# Patient Record
Sex: FEMALE | Race: BLACK OR AFRICAN AMERICAN | NOT HISPANIC OR LATINO | Employment: OTHER | URBAN - METROPOLITAN AREA
[De-identification: names, ages, dates, MRNs, and addresses within clinical notes are randomized per-mention and may not be internally consistent; named-entity substitution may affect disease eponyms.]

---

## 2017-01-29 ENCOUNTER — HOSPITAL ENCOUNTER (EMERGENCY)
Facility: CLINIC | Age: 67
Discharge: HOME | End: 2017-01-29
Attending: INTERNAL MEDICINE | Admitting: INTERNAL MEDICINE
Payer: COMMERCIAL

## 2017-01-29 ENCOUNTER — APPOINTMENT (OUTPATIENT)
Dept: GENERAL RADIOLOGY | Facility: CLINIC | Age: 67
End: 2017-01-29
Attending: INTERNAL MEDICINE
Payer: COMMERCIAL

## 2017-01-29 VITALS
TEMPERATURE: 98.2 F | RESPIRATION RATE: 20 BRPM | DIASTOLIC BLOOD PRESSURE: 80 MMHG | HEART RATE: 104 BPM | SYSTOLIC BLOOD PRESSURE: 148 MMHG | OXYGEN SATURATION: 93 %

## 2017-01-29 DIAGNOSIS — R42 DIZZINESS: ICD-10-CM

## 2017-01-29 LAB
ALBUMIN SERPL-MCNC: 3.3 G/DL (ref 3.4–5)
ALP SERPL-CCNC: 62 U/L (ref 40–150)
ALT SERPL W P-5'-P-CCNC: 20 U/L (ref 0–50)
ANION GAP SERPL CALCULATED.3IONS-SCNC: 7 MMOL/L (ref 3–14)
AST SERPL W P-5'-P-CCNC: 16 U/L (ref 0–45)
BASOPHILS # BLD AUTO: 0.1 10E9/L (ref 0–0.2)
BASOPHILS NFR BLD AUTO: 0.6 %
BILIRUB SERPL-MCNC: 0.2 MG/DL (ref 0.2–1.3)
BUN SERPL-MCNC: 10 MG/DL (ref 7–30)
CALCIUM SERPL-MCNC: 9.4 MG/DL (ref 8.5–10.1)
CHLORIDE SERPL-SCNC: 104 MMOL/L (ref 94–109)
CO2 SERPL-SCNC: 29 MMOL/L (ref 20–32)
CREAT SERPL-MCNC: 0.81 MG/DL (ref 0.52–1.04)
DIFFERENTIAL METHOD BLD: ABNORMAL
EOSINOPHIL # BLD AUTO: 0.3 10E9/L (ref 0–0.7)
EOSINOPHIL NFR BLD AUTO: 3.1 %
ERYTHROCYTE [DISTWIDTH] IN BLOOD BY AUTOMATED COUNT: 14.6 % (ref 10–15)
GFR SERPL CREATININE-BSD FRML MDRD: 71 ML/MIN/1.7M2
GLUCOSE BLDC GLUCOMTR-MCNC: 95 MG/DL (ref 70–99)
GLUCOSE SERPL-MCNC: 82 MG/DL (ref 70–99)
HCT VFR BLD AUTO: 42 % (ref 35–47)
HGB BLD-MCNC: 13.3 G/DL (ref 11.7–15.7)
IMM GRANULOCYTES # BLD: 0 10E9/L (ref 0–0.4)
IMM GRANULOCYTES NFR BLD: 0.2 %
LYMPHOCYTES # BLD AUTO: 2.6 10E9/L (ref 0.8–5.3)
LYMPHOCYTES NFR BLD AUTO: 27.1 %
MCH RBC QN AUTO: 26.2 PG (ref 26.5–33)
MCHC RBC AUTO-ENTMCNC: 31.7 G/DL (ref 31.5–36.5)
MCV RBC AUTO: 83 FL (ref 78–100)
MONOCYTES # BLD AUTO: 0.8 10E9/L (ref 0–1.3)
MONOCYTES NFR BLD AUTO: 8.3 %
NEUTROPHILS # BLD AUTO: 5.8 10E9/L (ref 1.6–8.3)
NEUTROPHILS NFR BLD AUTO: 60.7 %
NRBC # BLD AUTO: 0 10*3/UL
NRBC BLD AUTO-RTO: 0 /100
PLATELET # BLD AUTO: 333 10E9/L (ref 150–450)
POTASSIUM SERPL-SCNC: 3.9 MMOL/L (ref 3.4–5.3)
PROT SERPL-MCNC: 8.1 G/DL (ref 6.8–8.8)
RBC # BLD AUTO: 5.07 10E12/L (ref 3.8–5.2)
SODIUM SERPL-SCNC: 140 MMOL/L (ref 133–144)
TROPONIN I SERPL-MCNC: NORMAL UG/L (ref 0–0.04)
WBC # BLD AUTO: 9.6 10E9/L (ref 4–11)

## 2017-01-29 PROCEDURE — 00000146 ZZHCL STATISTIC GLUCOSE BY METER IP

## 2017-01-29 PROCEDURE — 84484 ASSAY OF TROPONIN QUANT: CPT | Performed by: INTERNAL MEDICINE

## 2017-01-29 PROCEDURE — 93005 ELECTROCARDIOGRAM TRACING: CPT | Performed by: INTERNAL MEDICINE

## 2017-01-29 PROCEDURE — 99285 EMERGENCY DEPT VISIT HI MDM: CPT | Mod: 25 | Performed by: INTERNAL MEDICINE

## 2017-01-29 PROCEDURE — 25900017 H RX MED GY IP 259 OP 259 PS 637: Performed by: INTERNAL MEDICINE

## 2017-01-29 PROCEDURE — 85025 COMPLETE CBC W/AUTO DIFF WBC: CPT | Performed by: INTERNAL MEDICINE

## 2017-01-29 PROCEDURE — 71010 XR CHEST PORT 1 VW: CPT

## 2017-01-29 PROCEDURE — 80053 COMPREHEN METABOLIC PANEL: CPT | Performed by: INTERNAL MEDICINE

## 2017-01-29 PROCEDURE — 93010 ELECTROCARDIOGRAM REPORT: CPT | Mod: Z6 | Performed by: INTERNAL MEDICINE

## 2017-01-29 PROCEDURE — 25000128 H RX IP 250 OP 636: Performed by: INTERNAL MEDICINE

## 2017-01-29 PROCEDURE — 96360 HYDRATION IV INFUSION INIT: CPT | Performed by: INTERNAL MEDICINE

## 2017-01-29 RX ORDER — MECLIZINE HYDROCHLORIDE 25 MG/1
25 TABLET ORAL ONCE
Status: COMPLETED | OUTPATIENT
Start: 2017-01-29 | End: 2017-01-29

## 2017-01-29 RX ORDER — ONDANSETRON 4 MG/1
4 TABLET, ORALLY DISINTEGRATING ORAL EVERY 8 HOURS PRN
Qty: 10 TABLET | Refills: 0 | Status: SHIPPED | OUTPATIENT
Start: 2017-01-29 | End: 2017-02-01

## 2017-01-29 RX ORDER — MECLIZINE HCL 12.5 MG 12.5 MG/1
12.5 TABLET ORAL 4 TIMES DAILY PRN
Qty: 30 TABLET | Refills: 0 | Status: SHIPPED | OUTPATIENT
Start: 2017-01-29 | End: 2017-04-28

## 2017-01-29 RX ADMIN — MECLIZINE HYDROCHLORIDE 25 MG: 25 TABLET ORAL at 16:06

## 2017-01-29 RX ADMIN — SODIUM CHLORIDE 1000 ML: 9 INJECTION, SOLUTION INTRAVENOUS at 16:01

## 2017-01-29 ASSESSMENT — ENCOUNTER SYMPTOMS
NAUSEA: 1
HEADACHES: 1
DIZZINESS: 1
FEVER: 0
SHORTNESS OF BREATH: 0
ABDOMINAL PAIN: 0
APPETITE CHANGE: 1
LIGHT-HEADEDNESS: 0
VOMITING: 0

## 2017-01-29 NOTE — ED AVS SNAPSHOT
[facility], [place], Emergency Department    [address]    [facility] [place] [address]    Phone:  [phone]    Fax:  [phone]                                       Earnest Short   MRN: [id number]    Department:  [facility], Emergency Department   Date of Visit:  1/29/2017           After Visit Summary Signature Page     I have received my discharge instructions, and my questions have been answered. I have discussed any challenges I see with this plan with the nurse or doctor.    ..........................................................................................................................................  Patient/Patient Representative Signature      ..........................................................................................................................................  Patient Representative Print Name and Relationship to Patient    ..................................................               ................................................  Date                                            Time    ..........................................................................................................................................  Reviewed by Signature/Title    ...................................................              ..............................................  Date                                                            Time

## 2017-01-29 NOTE — DISCHARGE INSTRUCTIONS
Dizziness (Vertigo) and Balance Problems: Ensuring Your Safety  Falls or accidents can lead to pain, broken bones, and fear of future falls. Protect yourself and others by preparing for episodes. Simple steps can help increase your safety at home and wherever you go.  Lighting    Keep all areas well lit. This helps your eyes send the right signals to the brain. It also makes you less likely to trip and fall. If bright lights make symptoms worse, dim the lights or lie in a dark room until the dizziness passes. Then turn the lights back to their normal level.  Tips:    Keep a flashlight by the bed.    Place nightlights in bathrooms and hallways.    Replace burned-out bulbs, or have someone replace them for you.  Fall prevention  To reduce your risk of falling:    Rise out of a bed or chair slowly.    Wear low-heeled shoes that fit properly and have slip-resistant soles.    Remove throw rugs. Clear clutter from walkways.    Use handrails on stairs. Have handrails installed or adjusted if needed.    Install grab bars in the bathroom. Don't use towel racks for balance.    Use a shower stool. Also, apply adhesive strips to the shower or tub floor.  Going out  With a little time and preparation, you can get around safely.  Tips:    Bring a cane or walking aid if needed.    Give yourself plenty of time in case a dizziness episode begins.    Be patient. If an activity like walking through a crowded shop causes you stress, you may not be ready for it yet.  Driving  If you become dizzy or disoriented while driving, you could hurt yourself and others. That's why it's best to avoid driving until symptoms subside. In some cases, your license may be temporarily held until it's safe for you to drive again.  For safety:    Ask a friend to drive for you.    Take public transportation.    Walk to stores and other places when you can.  Asking for help  Don't be afraid to ask for help running errands, cooking meals, and doing  exercise. Whether it's a friend, loved one, neighbor, or stranger on the street, a little help can make a world of difference.     6913-7303 [employer]. [address] [place] [address]99. All rights reserved. This information is not intended as a substitute for professional medical care. Always follow your healthcare professional's instructions.          Managing Dizziness (Vertigo) with Medications  Although medications can't cure your problem, they can help control symptoms. Your doctor may prescribe medications for a few weeks and then taper them off.  If you have side effects from your medications, contact your healthcare provider right away.   How medications can help      Treat infection or inflammation. If you have a bacterial infection, your doctor can prescribe antibiotics.    Limit conflicting balance signals. These medications are often in pill form.    Ease nausea. Suppositories, pills, or shots may be used to reduce vomiting.    Reduce pressure in the canals. Diuretics can be used to treat Meniere's disease. These medications help rid the body of excess fluid.    Ease other symptoms. Other medications can help ease depression and anxiety caused by living with dizziness or fainting.    [phone] [employer]. [address] [place] [address] 14240. All rights reserved. This information is not intended as a substitute for professional medical care. Always follow your healthcare professional's instructions.      Please make an appointment to follow up with Your Primary Care Provider in 3-7 days even if entirely better.

## 2017-01-29 NOTE — ED AVS SNAPSHOT
[facility], Emergency Department    [address]    [facility] [place] [address]    Phone:  [phone]    Fax:  [phone]                                       Earnest Short   MRN: [id number]    Department:  [facility], Emergency Department   Date of Visit:  1/29/2017           Patient Information     Date Of Birth          1950        Your diagnoses for this visit were:     Dizziness        You were seen by Ruben Cavrer MD.        Discharge Instructions         Dizziness (Vertigo) and Balance Problems: Ensuring Your Safety  Falls or accidents can lead to pain, broken bones, and fear of future falls. Protect yourself and others by preparing for episodes. Simple steps can help increase your safety at home and wherever you go.  Lighting    Keep all areas well lit. This helps your eyes send the right signals to the brain. It also makes you less likely to trip and fall. If bright lights make symptoms worse, dim the lights or lie in a dark room until the dizziness passes. Then turn the lights back to their normal level.  Tips:    Keep a flashlight by the bed.    Place nightlights in bathrooms and hallways.    Replace burned-out bulbs, or have someone replace them for you.  Fall prevention  To reduce your risk of falling:    Rise out of a bed or chair slowly.    Wear low-heeled shoes that fit properly and have slip-resistant soles.    Remove throw rugs. Clear clutter from walkways.    Use handrails on stairs. Have handrails installed or adjusted if needed.    Install grab bars in the bathroom. Don't use towel racks for balance.    Use a shower stool. Also, apply adhesive strips to the shower or tub floor.  Going out  With a little time and preparation, you can get around safely.  Tips:    Bring a cane or walking aid if needed.    Give yourself plenty of time in case a dizziness episode begins.    Be patient. If an activity like walking through a crowded shop causes you stress, you may not be ready  for it yet.  Driving  If you become dizzy or disoriented while driving, you could hurt yourself and others. That's why it's best to avoid driving until symptoms subside. In some cases, your license may be temporarily held until it's safe for you to drive again.  For safety:    Ask a friend to drive for you.    Take public transportation.    Walk to stores and other places when you can.  Asking for help  Don't be afraid to ask for help running errands, cooking meals, and doing exercise. Whether it's a friend, loved one, neighbor, or stranger on the street, a little help can make a world of difference.     [phone] [employer]. [address] [place] [address] 40369. All rights reserved. This information is not intended as a substitute for professional medical care. Always follow your healthcare professional's instructions.          Managing Dizziness (Vertigo) with Medications  Although medications can't cure your problem, they can help control symptoms. Your doctor may prescribe medications for a few weeks and then taper them off.  If you have side effects from your medications, contact your healthcare provider right away.   How medications can help      Treat infection or inflammation. If you have a bacterial infection, your doctor can prescribe antibiotics.    Limit conflicting balance signals. These medications are often in pill form.    Ease nausea. Suppositories, pills, or shots may be used to reduce vomiting.    Reduce pressure in the canals. Diuretics can be used to treat Meniere's disease. These medications help rid the body of excess fluid.    Ease other symptoms. Other medications can help ease depression and anxiety caused by living with dizziness or fainting.    [phone] [employer]. [address] [place] [address]29. All rights reserved. This information is not intended as a substitute for professional medical care. Always follow your healthcare professional's  instructions.      Please make an appointment to follow up with Your Primary Care Provider in 3-7 days even if entirely better.     24 Hour Appointment Hotline       To make an appointment at any [facility], call [phone] ([phone]). If you don't have a family doctor or clinic, we will help you find one. [facility] clinics are conveniently located to serve the needs of you and your family.             Review of your medicines      START taking        Dose / Directions Last dose taken    meclizine 12.5 MG tablet   Commonly known as:  ANTIVERT   Dose:  12.5 mg   Quantity:  30 tablet        Take 1 tablet (12.5 mg) by mouth 4 times daily as needed for dizziness   Refills:  0        ondansetron 4 MG ODT tab   Commonly known as:  ZOFRAN ODT   Dose:  4 mg   Quantity:  10 tablet        Take 1 tablet (4 mg) by mouth every 8 hours as needed for nausea   Refills:  0          Our records show that you are taking the medicines listed below. If these are incorrect, please call your family doctor or clinic.        Dose / Directions Last dose taken    Calcium Carb-Cholecalciferol 1000-800 MG-UNIT Tabs   Commonly known as:  CALCIUM 1000 + D   Dose:  1 tablet   Quantity:  100 tablet        Take 1 tablet by mouth daily TAKE WITH FOOD, FOR BONE HEALTH AND LOW VITAMIN D ([facility])   Refills:  PRN        cholecalciferol 2000 UNITS Caps   Dose:  2 capsule   Quantity:  100 capsule        Take 2 capsules by mouth daily FOR VITAMIN D DEFICIENCY (LOW VITAMIN D)   Refills:  PRN        pantoprazole 40 MG EC tablet   Commonly known as:  PROTONIX   Dose:  40 mg   Quantity:  90 tablet        Take 1 tablet (40 mg) by mouth daily Take 30-60 minutes before a meal.   Refills:  0        * polyethylene glycol powder   Commonly known as:  MIRALAX   Dose:  1 capful   Quantity:  510 g        Take 17 g by mouth daily as needed for constipation   Refills:  1        * polyethylene glycol powder   Commonly known as:   MIRALAX   Dose:  1 capful   Quantity:  1 Bottle        Take 17 g by mouth daily INDICATION: CONSTIPATION, TO ACHIEVE 1-2 SOFT BMs PER DAY   Refills:  PRN        traMADol 50 MG tablet   Commonly known as:  ULTRAM   Dose:  50 mg   Quantity:  60 tablet        Take 1 tablet (50 mg) by mouth every 6 hours as needed for moderate pain   Refills:  0        * Notice:  This list has 2 medication(s) that are the same as other medications prescribed for you. Read the directions carefully, and ask your doctor or other care provider to review them with you.            Prescriptions were sent or printed at these locations (2 Prescriptions)                   Other Prescriptions                Printed at Department/Unit printer (2 of 2)         meclizine (ANTIVERT) 12.5 MG tablet               ondansetron (ZOFRAN ODT) 4 MG ODT tab                Procedures and tests performed during your visit     CBC with platelets differential    Comprehensive metabolic panel    EKG 12 lead    Glucose by meter    Glucose monitor nursing POCT    Troponin I    XR Chest Port 1 View      Orders Needing Specimen Collection     Ordered          01/29/17 1525  UA reflex to Microscopic and Culture - STAT, [facility]: STAT, Needs to be Collected     Scheduled Task Status   01/29/17 1525 Collect UA reflex to Microscopic and Culture Open   Order Class:  PCU Collect                  Pending Results     No orders found from 1/28/2017 to 1/30/2017.            Pending Culture Results     No orders found from 1/28/2017 to 1/30/2017.            Thank you for choosing [facility]       Thank you for choosing [facility] for your care. Our goal is always to provide you with excellent care. Hearing back from our patients is one way we can continue to improve our services. Please take a few minutes to complete the written survey that you may receive in the mail after you visit with us. Thank you!        [email or web address] Information     [employer] lets you send messages to your doctor, view  "your test results, renew your prescriptions, schedule appointments and more. To sign up, go to [email or web address] . Click on \"Log in\" on the left side of the screen, which will take you to the Welcome page. Then click on \"Sign up Now\" on the right side of the page.     You will be asked to enter the access code listed below, as well as some personal information. Please follow the directions to create your username and password.     Your access code is: [email or web address]  Expires: 2017  4:56 PM     Your access code will  in 90 days. If you need help or a new code, please call your [facility] clinic or [phone].        Care EveryWhere ID     This is your Care EveryWhere ID. This could be used by other organizations to access your [facility] medical records  [id number]        After Visit Summary       This is your record. Keep this with you and show to your community pharmacist(s) and doctor(s) at your next visit.                  "

## 2017-01-29 NOTE — ED PROVIDER NOTES
History     Chief Complaint   Patient presents with     Dizziness     3 days of dizzyness, poor appitite and mild headache     HPI  Earnest Short is a 67 year old female who presents for evaluation of dizziness. The patient reports that she developed dizziness described as room-spinning 3 days ago. This has persisted. Dizziness is worse with standing up or lying down. No lightheadedness/presyncope. Dizziness is associated with nausea and resultant anorexia. No vomiting. Patient also with complaint of headache and buzzing sensation in ear. Denies abdominal pain, chest pain, fevers. No other complaints. Of note, patient had similar symptoms in August 2016. She was seen and evaluated at [facility], underwent MRI brain.    MRI Brain 8/23/2016:  IMPRESSION:  1.  No recent infarct or other acute intracranial finding.  2.  Mild volume loss. Small number of T2 hyperintensities scattered in the cerebral white matter are nonspecific, probably gliosis from prior ischemic or inflammatory condition.  3.  Widely patent intracranial arteries. 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  4.  Normal carotid and vertebral arteries in the neck.    I have reviewed the Medications, Allergies, Past Medical and Surgical History, and Social History in the Epic system.    No current facility-administered medications for this encounter.     Current Outpatient Prescriptions   Medication     meclizine (ANTIVERT) 12.5 MG tablet     ondansetron (ZOFRAN ODT) 4 MG ODT tab     pantoprazole (PROTONIX) 40 MG enteric coated tablet     cholecalciferol 2000 UNITS CAPS     Calcium Carb-Cholecalciferol (CALCIUM 1000 + D) 1000-800 MG-UNIT TABS     traMADol (ULTRAM) 50 MG tablet     polyethylene glycol (MIRALAX) powder     polyethylene glycol (MIRALAX) powder     Past Medical History   Diagnosis Date     Vitamin D deficiency 2013     GERD (gastroesophageal reflux disease) 2013     History of Helicobacter pylori infection  2013     per blood test, completed triple therapy through another clinic; stool test 1/30/15 confirmed eradication       Past Surgical History   Procedure Laterality Date     Open reduction internal fixation femur distal  2013     distal left femur fracture       Family History   Problem Relation Age of Onset     Unknown/Adopted         Social History   Substance Use Topics     Smoking status: Never Smoker      Smokeless tobacco: Not on file     Alcohol Use: No      No Known Allergies    Review of Systems   Constitutional: Positive for appetite change. Negative for fever.   HENT:        Ear buzzing    Respiratory: Negative for shortness of breath.    Cardiovascular: Negative for chest pain.   Gastrointestinal: Positive for nausea. Negative for vomiting and abdominal pain.   Neurological: Positive for dizziness and headaches. Negative for syncope and light-headedness.   All other systems reviewed and are negative.      Physical Exam   BP: (!) 158/92 mmHg  Pulse: 104  Temp: 98.7  F (37.1  C)  Resp: 14  SpO2: 98 %  Physical Exam   Constitutional: She is oriented to person, place, and time. No distress.   HENT:   Head: Atraumatic.   Mouth/Throat: Oropharynx is clear and moist. No oropharyngeal exudate.   Eyes: Pupils are equal, round, and reactive to light. No scleral icterus.   Neck: Neck supple. No JVD present.   Cardiovascular: Regular rhythm, normal heart sounds and intact distal pulses.  Tachycardia present.  Exam reveals no gallop.    No murmur heard.  Pulmonary/Chest: Effort normal and breath sounds normal. No respiratory distress. She has no wheezes. She has no rales. She exhibits no tenderness.   Abdominal: Soft. Bowel sounds are normal. She exhibits no distension and no mass. There is no tenderness. There is no rebound and no guarding.   Musculoskeletal: She exhibits no edema or tenderness.   Neurological: She is alert and oriented to person, place, and time. No cranial nerve deficit. Coordination normal.    Skin: Skin is warm. No rash noted. She is not diaphoretic.       ED Course     Procedures       3:20 PM  The patient was seen and examined by Ruben Carver MD, in Room 19.        EKG Interpretation:      Interpreted by Ruben Carver MD  Time reviewed: on arrival  Symptoms at time of EKG: dizziness   Rhythm: normal sinus   Rate: normal  Axis: normal  Ectopy: none  Conduction: normal  ST Segments/ T Waves: No ST-T wave changes  Q Waves: none  Comparison to prior: No old EKG available    Clinical Impression: normal EKG          Critical Care time:  none         Results for orders placed or performed during the hospital encounter of 01/29/17 (from the past 24 hour(s))   CBC with platelets differential     Status: Abnormal    Collection Time: 01/29/17  3:04 PM   Result Value Ref Range    WBC 9.6 4.0 - 11.0 10e9/L    RBC Count 5.07 3.8 - 5.2 10e12/L    Hemoglobin 13.3 11.7 - 15.7 g/dL    Hematocrit 42.0 35.0 - 47.0 %    MCV 83 78 - 100 fl    MCH 26.2 (L) 26.5 - 33.0 pg    MCHC 31.7 31.5 - 36.5 g/dL    RDW 14.6 10.0 - 15.0 %    Platelet Count 333 150 - 450 10e9/L    Diff Method Automated Method     % Neutrophils 60.7 %    % Lymphocytes 27.1 %    % Monocytes 8.3 %    % Eosinophils 3.1 %    % Basophils 0.6 %    % Immature Granulocytes 0.2 %    Nucleated RBCs 0 0 /100    Absolute Neutrophil 5.8 1.6 - 8.3 10e9/L    Absolute Lymphocytes 2.6 0.8 - 5.3 10e9/L    Absolute Monocytes 0.8 0.0 - 1.3 10e9/L    Absolute Eosinophils 0.3 0.0 - 0.7 10e9/L    Absolute Basophils 0.1 0.0 - 0.2 10e9/L    Abs Immature Granulocytes 0.0 0 - 0.4 10e9/L    Absolute Nucleated RBC 0.0    Comprehensive metabolic panel     Status: Abnormal    Collection Time: 01/29/17  3:04 PM   Result Value Ref Range    Sodium 140 133 - 144 mmol/L    Potassium 3.9 3.4 - 5.3 mmol/L    Chloride 104 94 - 109 mmol/L    Carbon Dioxide 29 20 - 32 mmol/L    Anion Gap 7 3 - 14 mmol/L    Glucose 82 70 - 99 mg/dL    Urea Nitrogen 10 7 - 30 mg/dL    Creatinine 0.81 0.52 -  1.04 mg/dL    GFR Estimate 71 >60 mL/min/1.7m2    GFR Estimate If Black 86 >60 mL/min/1.7m2    Calcium 9.4 8.5 - 10.1 mg/dL    Bilirubin Total 0.2 0.2 - 1.3 mg/dL    Albumin 3.3 (L) 3.4 - 5.0 g/dL    Protein Total 8.1 6.8 - 8.8 g/dL    Alkaline Phosphatase 62 40 - 150 U/L    ALT 20 0 - 50 U/L    AST 16 0 - 45 U/L   Troponin I     Status: None    Collection Time: 01/29/17  3:04 PM   Result Value Ref Range    Troponin I ES  0.000 - 0.045 ug/L     <0.015  The 99th percentile for upper reference range is 0.045 ug/L.  Troponin values in   the range of 0.045 - 0.120 ug/L may be associated with risks of adverse   clinical events.     Glucose by meter     Status: None    Collection Time: 01/29/17  3:27 PM   Result Value Ref Range    Glucose 95 70 - 99 mg/dL   XR Chest Port 1 View     Status: None    Collection Time: 01/29/17  3:47 PM    Narrative    XR CHEST PORT 1 VW 1/29/2017 3:47 PM     HISTORY: dizziness     COMPARISON: None available      Impression    IMPRESSION:  No acute pulmonary disease.    AGNES JOYA MD             Labs Ordered and Resulted from Time of ED Arrival Up to the Time of Departure from the ED   CBC WITH PLATELETS DIFFERENTIAL - Abnormal; Notable for the following:     MCH 26.2 (*)     All other components within normal limits   COMPREHENSIVE METABOLIC PANEL - Abnormal; Notable for the following:     Albumin 3.3 (*)     All other components within normal limits   GLUCOSE MONITOR NURSING POCT   TROPONIN I   GLUCOSE BY METER   UA MACROSCOPIC WITH REFLEX TO MICRO AND CULTURE       Assessments & Plan (with Medical Decision Making)  68 yo [place] f with recurrent dizziness, MRI MRA neg back in Summer but noted incidental IC small aneurysm, EKG and trop neg, CXR, UA all neg, slightly dehydrated-NS 1 litter with HR 80's, tolerated meclizine with resolution of dizziness, will DC with zofran and meclizine then follow up with her PMD.       I have reviewed the nursing notes.    I have reviewed the findings,  diagnosis, plan and need for follow up with the patient.    Discharge Medication List as of 1/29/2017  4:56 PM      START taking these medications    Details   meclizine (ANTIVERT) 12.5 MG tablet Take 1 tablet (12.5 mg) by mouth 4 times daily as needed for dizziness, Disp-30 tablet, R-0, Local Print      ondansetron (ZOFRAN ODT) 4 MG ODT tab Take 1 tablet (4 mg) by mouth every 8 hours as needed for nausea, Disp-10 tablet, R-0, Local Print             Final diagnoses:   Dizziness       ITariq, am serving as a trained medical scribe to document services personally performed by Ruben Carver MD, based on the provider's statements to me.      Ruben CABRALES MD, was physically present and have reviewed and verified the accuracy of this note documented by Tariq Pizarro.    1/29/2017   [facility], [place], EMERGENCY DEPARTMENT      Ruben Carver MD  01/29/17 3110

## 2017-01-31 LAB — INTERPRETATION ECG - MUSE: NORMAL

## 2017-04-28 ENCOUNTER — HOSPITAL ENCOUNTER (EMERGENCY)
Facility: CLINIC | Age: 67
Discharge: HOME | End: 2017-04-29
Attending: FAMILY MEDICINE | Admitting: FAMILY MEDICINE
Payer: COMMERCIAL

## 2017-04-28 ENCOUNTER — APPOINTMENT (OUTPATIENT)
Dept: GENERAL RADIOLOGY | Facility: CLINIC | Age: 67
End: 2017-04-28
Attending: FAMILY MEDICINE
Payer: COMMERCIAL
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
## 2017-04-28 DIAGNOSIS — S70.00XA CONTUSION OF HIP, UNSPECIFIED LATERALITY, INITIAL ENCOUNTER: ICD-10-CM
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
## 2017-04-28 DIAGNOSIS — S70.01XA HEMATOMA OF RIGHT HIP, INITIAL ENCOUNTER: ICD-10-CM

## 2017-04-28 DIAGNOSIS — M54.50 LOW BACK PAIN WITHOUT SCIATICA, UNSPECIFIED BACK PAIN LATERALITY, UNSPECIFIED CHRONICITY: ICD-10-CM

## 2017-04-28 PROCEDURE — 99284 EMERGENCY DEPT VISIT MOD MDM: CPT | Mod: Z6 | Performed by: FAMILY MEDICINE

## 2017-04-28 PROCEDURE — 73502 X-RAY EXAM HIP UNI 2-3 VIEWS: CPT

## 2017-04-28 PROCEDURE — 99284 EMERGENCY DEPT VISIT MOD MDM: CPT | Performed by: FAMILY MEDICINE

## 2017-04-28 ASSESSMENT — ENCOUNTER SYMPTOMS
WEAKNESS: 0
NAUSEA: 0
VOMITING: 0
BACK PAIN: 1
NUMBNESS: 0
ABDOMINAL PAIN: 0

## 2017-04-28 NOTE — ED AVS SNAPSHOT
[facility], Emergency Department    [address]    [place] [address]    Phone:  [phone]    Fax:  [phone]                                       Earnest Short   MRN: [id number]    Department:  [facility], Emergency Department   Date of Visit:  4/28/2017           Patient Information     Date Of Birth          1950        Your diagnoses for this visit were:     Fall, initial encounter     Low back pain without sciatica, unspecified back pain laterality, unspecified chronicity     Contusion of hip, unspecified laterality, initial encounter        You were seen by Ranjit Jones MD.      Follow-up Information     Follow up with primary MD for recheck.        Discharge Instructions       Discharged to home rest use ibuprofen and Flexeril and follow up with her primary M.D. if not improving.    24 Hour Appointment Hotline       To make an appointment at any [facility] clinic, call [phone] ([phone]). If you don't have a family doctor or clinic, we will help you find one. [facility] clinics are conveniently located to serve the needs of you and your family.             Review of your medicines      START taking        Dose / Directions Last dose taken    cyclobenzaprine 10 MG tablet   Commonly known as:  FLEXERIL   Dose:  10 mg   Quantity:  20 tablet        Take 1 tablet (10 mg) by mouth 3 times daily as needed for muscle spasms   Refills:  0        ibuprofen 800 MG tablet   Commonly known as:  ADVIL/MOTRIN   Dose:  800 mg   Quantity:  15 tablet        Take 1 tablet (800 mg) by mouth every 8 hours as needed   Refills:  0          Our records show that you are taking the medicines listed below. If these are incorrect, please call your family doctor or clinic.        Dose / Directions Last dose taken    Calcium Carb-Cholecalciferol 1000-800 MG-UNIT Tabs   Commonly known as:  CALCIUM 1000 + D   Dose:  1 tablet   Quantity:  100 tablet        Take 1 tablet by mouth  "daily TAKE WITH FOOD, FOR BONE HEALTH AND LOW VITAMIN D ([facility]   Refills:  PRN        cholecalciferol 2000 UNITS Caps   Dose:  2 capsule   Quantity:  100 capsule        Take 2 capsules by mouth daily FOR VITAMIN D DEFICIENCY (LOW VITAMIN D)   Refills:  PRN        pantoprazole 40 MG EC tablet   Commonly known as:  PROTONIX   Dose:  40 mg   Quantity:  90 tablet        Take 1 tablet (40 mg) by mouth daily Take 30-60 minutes before a meal.   Refills:  0                Prescriptions were sent or printed at these locations (2 Prescriptions)                   Other Prescriptions                Printed at Department/Unit printer (2 of 2)         ibuprofen (ADVIL/MOTRIN) 800 MG tablet               cyclobenzaprine (FLEXERIL) 10 MG tablet                Procedures and tests performed during your visit     Hip XR, 2+ views, left    Lumbar spine XR, 2-3 views      Orders Needing Specimen Collection     None      Pending Results     No orders found for last 3 day(s).            Pending Culture Results     No orders found for last 3 day(s).            Thank you for choosing [facility]       Thank you for choosing [facility] for your care. Our goal is always to provide you with excellent care. Hearing back from our patients is one way we can continue to improve our services. Please take a few minutes to complete the written survey that you may receive in the mail after you visit with us. Thank you!        [facility] Information     [facility] lets you send messages to your doctor, view your test results, renew your prescriptions, schedule appointments and more. To sign up, go to [email or web address] . Click on \"Log in\" on the left side of the screen, which will take you to the Welcome page. Then click on \"Sign up Now\" on the right side of the page.     You will be asked to enter the access code listed below, as well as some personal information. Please follow the directions to create your username and " password.     Your access code is: [email or web address]  Expires: 2017  5:56 PM     Your access code will  in 90 days. If you need help or a new code, please call your [facility] clinic or [phone].        Care EveryWhere ID     This is your Care EveryWhere ID. This could be used by other organizations to access your [facility] medical records  [id number]        After Visit Summary       This is your record. Keep this with you and show to your community pharmacist(s) and doctor(s) at your next visit.

## 2017-04-28 NOTE — ED AVS SNAPSHOT
[facility], [place], Emergency Department    [address]    [facility] [place] [place] [address]    Phone:  [phone]    Fax:  [phone]                                       Earnest Short   MRN: [id number]    Department:  [facility], Emergency Department   Date of Visit:  4/28/2017           After Visit Summary Signature Page     I have received my discharge instructions, and my questions have been answered. I have discussed any challenges I see with this plan with the nurse or doctor.    ..........................................................................................................................................  Patient/Patient Representative Signature      ..........................................................................................................................................  Patient Representative Print Name and Relationship to Patient    ..................................................               ................................................  Date                                            Time    ..........................................................................................................................................  Reviewed by Signature/Title    ...................................................              ..............................................  Date                                                            Time

## 2017-04-29 ENCOUNTER — APPOINTMENT (OUTPATIENT)
Dept: GENERAL RADIOLOGY | Facility: CLINIC | Age: 67
End: 2017-04-29
Attending: FAMILY MEDICINE
Payer: COMMERCIAL

## 2017-04-29 VITALS
TEMPERATURE: 96.3 F | RESPIRATION RATE: 20 BRPM | HEART RATE: 86 BPM | SYSTOLIC BLOOD PRESSURE: 157 MMHG | OXYGEN SATURATION: 99 % | BODY MASS INDEX: 32.48 KG/M2 | WEIGHT: 192.19 LBS | DIASTOLIC BLOOD PRESSURE: 88 MMHG

## 2017-04-29 PROCEDURE — 72100 X-RAY EXAM L-S SPINE 2/3 VWS: CPT

## 2017-04-29 RX ORDER — CYCLOBENZAPRINE HCL 10 MG
10 TABLET ORAL 3 TIMES DAILY PRN
Qty: 20 TABLET | Refills: 0 | Status: SHIPPED | OUTPATIENT
Start: 2017-04-29 | End: 2017-05-05

## 2017-04-29 RX ORDER — IBUPROFEN 800 MG/1
800 TABLET, FILM COATED ORAL EVERY 8 HOURS PRN
Qty: 15 TABLET | Refills: 0 | Status: SHIPPED | OUTPATIENT
Start: 2017-04-29 | End: 2017-05-07

## 2017-04-29 NOTE — ED PROVIDER NOTES
History     Chief Complaint   Patient presents with     Back Pain     Onset last night, fell in bathroom, today has lower back pain, worse on left side.     Patient is a 67 year old female presenting with back pain. A  was used.   Back Pain   Associated symptoms: no abdominal pain, no chest pain, no numbness and no weakness    Patient states she fell 2 days ago and landed on her right knee. Patient reports she has been having a hard time walking, sitting, or standing since the fall. She has broken her right upper leg before. She states most of her pain is on the low back and right hip; she has no pain in her right knee currently. She denies weakness, numbness, tingling, headaches, vomiting, nausea, vision problems, abdominal pain,or  chest pain. She denies previous history of hypertension or diabetes.     PAST MEDICAL HISTORY  Past Medical History:   Diagnosis Date     GERD (gastroesophageal reflux disease) 2013     History of Helicobacter pylori infection 2013    per blood test, completed triple therapy through another clinic; stool test 1/30/15 confirmed eradication     Vitamin D deficiency 2013     PAST SURGICAL HISTORY  Past Surgical History:   Procedure Laterality Date     OPEN REDUCTION INTERNAL FIXATION FEMUR DISTAL  2013    distal left femur fracture     FAMILY HISTORY  Family History   Problem Relation Age of Onset     Unknown/Adopted       SOCIAL HISTORY  Social History   Substance Use Topics     Smoking status: Never Smoker     Smokeless tobacco: Not on file     Alcohol use No     MEDICATIONS  No current facility-administered medications for this encounter.      Current Outpatient Prescriptions   Medication     ibuprofen (ADVIL/MOTRIN) 800 MG tablet     cyclobenzaprine (FLEXERIL) 10 MG tablet     cholecalciferol 2000 UNITS CAPS     pantoprazole (PROTONIX) 40 MG enteric coated tablet     Calcium Carb-Cholecalciferol (CALCIUM 1000 + D) 1000-800 MG-UNIT TABS     ALLERGIES  No Known  Allergies      I have reviewed the Medications, Allergies, Past Medical and Surgical History, and Social History in the Epic system.    Review of Systems   Eyes: Negative for visual disturbance.   Cardiovascular: Negative for chest pain.   Gastrointestinal: Negative for abdominal pain, nausea and vomiting.   Musculoskeletal: Positive for back pain.        Positive for right hip pain.    Neurological: Negative for weakness and numbness.   All other systems reviewed and are negative.      Physical Exam   BP: (!) 165/96  Pulse: 86  Heart Rate: 86  Temp: 96.3  F (35.7  C)  Resp: 16  Weight: 87.2 kg (192 lb 3 oz)  SpO2: 98 %  Physical Exam   Constitutional: She is oriented to person, place, and time. No distress.   HENT:   Head: Atraumatic.   Mouth/Throat: Oropharynx is clear and moist. No oropharyngeal exudate.   Eyes: Pupils are equal, round, and reactive to light. No scleral icterus.   Cardiovascular: Normal heart sounds and intact distal pulses.    Pulmonary/Chest: Breath sounds normal. No respiratory distress.   Abdominal: Soft. Bowel sounds are normal. There is no tenderness.   Musculoskeletal:        Left hip: She exhibits tenderness. She exhibits no swelling and no deformity.        Lumbar back: She exhibits tenderness, pain and spasm.   Neurological: She is alert and oriented to person, place, and time. She has normal reflexes. She displays normal reflexes. No cranial nerve deficit. She exhibits normal muscle tone. Coordination normal.   Skin: Skin is warm. No rash noted. She is not diaphoretic.       ED Course     ED Course     Procedures   10:28 PM  The patient was seen and examined by Dr. Jones in Room 4.            Critical Care time:  none     Labs/Imaging:    Results for orders placed or performed during the hospital encounter of 04/28/17 (from the past 24 hour(s))   Hip XR, 2+ views, left    Narrative    XR HIP LEFT 2-3 VIEWS 4/29/2017 12:14 AM    HISTORY: Pain.    COMPARISON: None.    FINDINGS: No  fracture or malalignment. Mild symmetric degenerative  change at the hips. Osseous structures are otherwise within normal  limits.      Impression    IMPRESSION: No acute osseous abnormality.    CAROL YEE MD   Lumbar spine XR, 2-3 views    Narrative    XR LUMBAR SPINE 2-3 VIEWS 4/29/2017 12:16 AM    HISTORY: Pain.    COMPARISON: None.    FINDINGS: Lumbar alignment is anatomic. Vertebral body heights and  disc spaces are preserved. Sacroiliac joints are patent and symmetric.      Impression    IMPRESSION: Normal lumbar spine.    CAROL YEE MD                Assessments & Plan (with Medical Decision Making)       I have reviewed the nursing notes.    I have reviewed the findings, diagnosis, plan and need for follow up with the patient.  Patient with low back pain and right hip pain secondary to fall at this time patient will be discharged home with anti-inflammatories and muscle relaxant and will follow up with primary M.D. if not improving she also is instructed to return to the emergency room if she has any marked increase in pain or weakness.    New Prescriptions    CYCLOBENZAPRINE (FLEXERIL) 10 MG TABLET    Take 1 tablet (10 mg) by mouth 3 times daily as needed for muscle spasms    IBUPROFEN (ADVIL/MOTRIN) 800 MG TABLET    Take 1 tablet (800 mg) by mouth every 8 hours as needed       Final diagnoses:   Fall, initial encounter   Low back pain without sciatica, unspecified back pain laterality, unspecified chronicity   Contusion of hip, unspecified laterality, initial encounter     IZane, am serving as a trained medical scribe to document services personally performed by Ranjit Jones MD, based on the provider's statements to me.      IRanjit MD, was physically present and have reviewed and verified the accuracy of this note documented by Zane Batista    4/28/2017   [facility] EMERGENCY DEPARTMENT     Ranjit Jones MD  04/29/17 0032

## 2017-04-29 NOTE — DISCHARGE INSTRUCTIONS
Discharged to home rest use ibuprofen and Flexeril and follow up with her primary M.D. if not improving.

## 2020-01-07 ENCOUNTER — TELEPHONE (OUTPATIENT)
Dept: FAMILY MEDICINE | Facility: CLINIC | Age: 70
End: 2020-01-07

## 2020-01-07 NOTE — TELEPHONE ENCOUNTER
Earnest Short 's transfer back to MA basket at [facility].    I have attempted to contact this patient by phone with the following results: left message to return my call on answering machine.    1st attempt    Alee Gonzalez MA on 1/7/2020 at 10:37 AM              Panel Management Review      Patient has the following on her problem list: None      Composite cancer screening  Chart review shows that this patient is due/due soon for the following Pap Smear, Mammogram, Colonoscopy and Fecal Colorectal (FIT)  Summary:    Patient is due/failing the following:   Health Maintenance Due   Topic Date Due     DEXA  1950     HEPATITIS C SCREENING  1950     ADVANCE CARE PLANNING  1950     MAMMO SCREENING  1950     COLONOSCOPY  01/01/1962     DTAP/TDAP/TD IMMUNIZATION (1 - Tdap) 01/01/1963     ZOSTER IMMUNIZATION (1 of 2) 01/01/2002     MEDICARE ANNUAL WELLNESS VISIT  01/01/2017     FALL RISK ASSESSMENT  01/01/2017     PNEUMOCOCCAL IMMUNIZATION 65+ LOW/MEDIUM RISK (1 of 2 - PCV13) 01/01/2017     INFLUENZA VACCINE (1) 09/01/2019     PHQ-2  01/01/2020         Action needed:   Patient needs office visit for  All the above.    Type of outreach:    Phone, left message for patient to call back.     Questions for provider review:    None                                                                                                                                    Alee Gonzalez MA       Chart routed to Care Team .

## 2023-04-21 ENCOUNTER — OFFICE VISIT (OUTPATIENT)
Dept: URGENT CARE | Facility: URGENT CARE | Age: 73
End: 2023-04-21
Payer: COMMERCIAL

## 2023-04-21 VITALS
HEART RATE: 91 BPM | SYSTOLIC BLOOD PRESSURE: 151 MMHG | TEMPERATURE: 98.2 F | DIASTOLIC BLOOD PRESSURE: 81 MMHG | OXYGEN SATURATION: 99 % | WEIGHT: 205.4 LBS | BODY MASS INDEX: 34.71 KG/M2

## 2023-04-21 DIAGNOSIS — Z86.19 HISTORY OF HELICOBACTER PYLORI INFECTION: ICD-10-CM

## 2023-04-21 DIAGNOSIS — R10.13 EPIGASTRIC PAIN: Primary | ICD-10-CM

## 2023-04-21 DIAGNOSIS — R14.0 BLOATING: ICD-10-CM

## 2023-04-21 DIAGNOSIS — D50.9 IRON DEFICIENCY ANEMIA, UNSPECIFIED IRON DEFICIENCY ANEMIA TYPE: ICD-10-CM

## 2023-04-21 LAB
ALBUMIN SERPL-MCNC: 3.8 G/DL (ref 3.4–5)
ALP SERPL-CCNC: 51 U/L (ref 40–150)
ALT SERPL W P-5'-P-CCNC: 21 U/L (ref 0–50)
AMYLASE SERPL-CCNC: 73 U/L (ref 28–100)
ANION GAP SERPL CALCULATED.3IONS-SCNC: 8 MMOL/L (ref 3–14)
AST SERPL W P-5'-P-CCNC: 23 U/L (ref 0–45)
BASOPHILS # BLD AUTO: 0.1 10E3/UL (ref 0–0.2)
BASOPHILS NFR BLD AUTO: 1 %
BILIRUB SERPL-MCNC: 0.5 MG/DL (ref 0.2–1.3)
BUN SERPL-MCNC: 10 MG/DL (ref 7–30)
CALCIUM SERPL-MCNC: 10.2 MG/DL (ref 8.5–10.1)
CHLORIDE BLD-SCNC: 104 MMOL/L (ref 94–109)
CO2 SERPL-SCNC: 29 MMOL/L (ref 20–32)
CREAT SERPL-MCNC: 0.8 MG/DL (ref 0.52–1.04)
EOSINOPHIL # BLD AUTO: 0.2 10E3/UL (ref 0–0.7)
EOSINOPHIL NFR BLD AUTO: 3 %
ERYTHROCYTE [DISTWIDTH] IN BLOOD BY AUTOMATED COUNT: 16.5 % (ref 10–15)
GFR SERPL CREATININE-BSD FRML MDRD: 78 ML/MIN/1.73M2
GLUCOSE BLD-MCNC: 91 MG/DL (ref 70–99)
HCT VFR BLD AUTO: 38.3 % (ref 35–47)
HGB BLD-MCNC: 11.5 G/DL (ref 11.7–15.7)
LIPASE SERPL-CCNC: 23 U/L (ref 13–60)
LYMPHOCYTES # BLD AUTO: 2.6 10E3/UL (ref 0.8–5.3)
LYMPHOCYTES NFR BLD AUTO: 35 %
MCH RBC QN AUTO: 23.4 PG (ref 26.5–33)
MCHC RBC AUTO-ENTMCNC: 30 G/DL (ref 31.5–36.5)
MCV RBC AUTO: 78 FL (ref 78–100)
MONOCYTES # BLD AUTO: 0.8 10E3/UL (ref 0–1.3)
MONOCYTES NFR BLD AUTO: 10 %
NEUTROPHILS # BLD AUTO: 3.7 10E3/UL (ref 1.6–8.3)
NEUTROPHILS NFR BLD AUTO: 51 %
PLATELET # BLD AUTO: 396 10E3/UL (ref 150–450)
POTASSIUM BLD-SCNC: 4.9 MMOL/L (ref 3.4–5.3)
PROT SERPL-MCNC: 8 G/DL (ref 6.8–8.8)
RBC # BLD AUTO: 4.92 10E6/UL (ref 3.8–5.2)
SODIUM SERPL-SCNC: 141 MMOL/L (ref 133–144)
WBC # BLD AUTO: 7.3 10E3/UL (ref 4–11)

## 2023-04-21 PROCEDURE — 99204 OFFICE O/P NEW MOD 45 MIN: CPT | Performed by: PHYSICIAN ASSISTANT

## 2023-04-21 PROCEDURE — 80053 COMPREHEN METABOLIC PANEL: CPT | Performed by: PHYSICIAN ASSISTANT

## 2023-04-21 PROCEDURE — 83690 ASSAY OF LIPASE: CPT | Performed by: PHYSICIAN ASSISTANT

## 2023-04-21 PROCEDURE — 85025 COMPLETE CBC W/AUTO DIFF WBC: CPT | Performed by: PHYSICIAN ASSISTANT

## 2023-04-21 PROCEDURE — 36415 COLL VENOUS BLD VENIPUNCTURE: CPT | Performed by: PHYSICIAN ASSISTANT

## 2023-04-21 PROCEDURE — 82150 ASSAY OF AMYLASE: CPT | Performed by: PHYSICIAN ASSISTANT

## 2023-04-21 RX ORDER — SUCRALFATE ORAL 1 G/10ML
1 SUSPENSION ORAL 2 TIMES DAILY PRN
Qty: 414 ML | Refills: 0 | Status: SHIPPED | OUTPATIENT
Start: 2023-04-21

## 2023-04-21 RX ORDER — PANTOPRAZOLE SODIUM 40 MG/1
40 TABLET, DELAYED RELEASE ORAL DAILY
Qty: 90 TABLET | Refills: 0 | Status: SHIPPED | OUTPATIENT
Start: 2023-04-21

## 2023-04-21 NOTE — PROGRESS NOTES
Assessment & Plan     Epigastric pain    Lipase and Amylase NEG for pancreaitis  GI cocktail given, improved symptoms    Patient to start on protonix and carafate  Advised to follow up with PCP for referral to GI    - Lipase; Future  - Amylase; Future  - lidocaine (viscous) (XYLOCAINE) 2 % 15 mL, alum & mag hydroxide-simethicone (MAALOX) 15 mL GI Cocktail    - CBC with platelets and differential  - Comprehensive metabolic panel (BMP + Alb, Alk Phos, ALT, AST, Total. Bili, TP)  - pantoprazole (PROTONIX) 40 MG EC tablet; Take 1 tablet (40 mg) by mouth daily  - sucralfate (CARAFATE) 1 GM/10ML suspension; Take 10 mLs (1 g) by mouth 2 times daily as needed (stomach ache)    Bloating    H pylori testing pending  Return to clinic  - Helicobacter pylori Antigen Stool  - Comprehensive metabolic panel (BMP + Alb, Alk Phos, ALT, AST, Total. Bili, TP)    History of Helicobacter pylori infection    Patient being tested for H Pylori  Advise to have PCP refer to GI    Iron deficiency anemia, unspecified iron deficiency anemia type    Anemia is a condition that occurs when your body doesn't have enough healthy red blood cells (RBCs). RBCs are the parts of your blood that carry oxygen all over your body. A protein called hemoglobin allows your RBCs to absorb and release oxygen. Without enough RBCs or hemoglobin, your body doesn't get enough oxygen. Symptoms of anemia may then occur.    This is likely due to diet, no GI bleed as patient is not having black stools nor a lot of epigastric pain  Advised to have follow up with PCP      Review of external notes as documented elsewhere in note       Follow up with PCP    No follow-ups on file.    Giancarlo Cortés, [facility], PA-C  [facility] NAOMIE Tinoco is a 73 year old, presenting for the following health issues:  Gas and Bloating (Been a few weeks. Been struggling, with gas and acid reflux. Pain upper stomach. Her daughter was diagnosed with H. Piylori and  lives with [place]. Not eating well. Very weak. )         View : No data to display.              HPI   Review of Systems   Constitutional, HEENT, cardiovascular, pulmonary, GI, , musculoskeletal, neuro, skin, endocrine and psych systems are negative, except as otherwise noted.      Objective    BP (!) 151/81   Pulse 91   Temp 98.2  F (36.8  C) (Oral)   Wt 93.2 kg (205 lb 6.4 oz)   SpO2 99%   BMI 34.71 kg/m    Body mass index is 34.71 kg/m .  Physical Exam   GENERAL: healthy, alert and no distress  NECK: no adenopathy, no asymmetry, masses, or scars and thyroid normal to palpation  RESP: lungs clear to auscultation - no rales, rhonchi or wheezes  CV: regular rate and rhythm, normal S1 S2, no S3 or S4, no murmur, click or rub, no peripheral edema and peripheral pulses strong  ABDOMEN: no pain and bowel sounds normal.  Positive for epigastric burning and bloating  MS: no gross musculoskeletal defects noted, no edema  SKIN: no suspicious lesions or rashes  NEURO: Normal strength and tone, mentation intact and speech normal  PSYCH: mentation appears normal, affect normal/bright      Results for orders placed or performed in visit on 04/21/23   Comprehensive metabolic panel (BMP + Alb, Alk Phos, ALT, AST, Total. Bili, TP)     Status: Abnormal   Result Value Ref Range    Sodium 141 133 - 144 mmol/L    Potassium 4.9 3.4 - 5.3 mmol/L    Chloride 104 94 - 109 mmol/L    Carbon Dioxide (CO2) 29 20 - 32 mmol/L    Anion Gap 8 3 - 14 mmol/L    Urea Nitrogen 10 7 - 30 mg/dL    Creatinine 0.80 0.52 - 1.04 mg/dL    Calcium 10.2 (H) 8.5 - 10.1 mg/dL    Glucose 91 70 - 99 mg/dL    Alkaline Phosphatase 51 40 - 150 U/L    AST 23 0 - 45 U/L    ALT 21 0 - 50 U/L    Protein Total 8.0 6.8 - 8.8 g/dL    Albumin 3.8 3.4 - 5.0 g/dL    Bilirubin Total 0.5 0.2 - 1.3 mg/dL    GFR Estimate 78 >60 mL/min/1.73m2   CBC with platelets and differential     Status: Abnormal   Result Value Ref Range    WBC Count 7.3 4.0 - 11.0 10e3/uL    RBC Count  4.92 3.80 - 5.20 10e6/uL    Hemoglobin 11.5 (L) 11.7 - 15.7 g/dL    Hematocrit 38.3 35.0 - 47.0 %    MCV 78 78 - 100 fL    MCH 23.4 (L) 26.5 - 33.0 pg    MCHC 30.0 (L) 31.5 - 36.5 g/dL    RDW 16.5 (H) 10.0 - 15.0 %    Platelet Count 396 150 - 450 10e3/uL    % Neutrophils 51 %    % Lymphocytes 35 %    % Monocytes 10 %    % Eosinophils 3 %    % Basophils 1 %    Absolute Neutrophils 3.7 1.6 - 8.3 10e3/uL    Absolute Lymphocytes 2.6 0.8 - 5.3 10e3/uL    Absolute Monocytes 0.8 0.0 - 1.3 10e3/uL    Absolute Eosinophils 0.2 0.0 - 0.7 10e3/uL    Absolute Basophils 0.1 0.0 - 0.2 10e3/uL   CBC with platelets and differential     Status: Abnormal    Narrative    The following orders were created for panel order CBC with platelets and differential.  Procedure                               Abnormality         Status                     ---------                               -----------         ------                     CBC with platelets and d...[[id number]]  Abnormal            Final result                 Please view results for these tests on the individual orders.

## 2023-04-22 PROCEDURE — 87338 HPYLORI STOOL AG IA: CPT | Performed by: PHYSICIAN ASSISTANT

## 2023-04-24 LAB — H PYLORI AG STL QL IA: NEGATIVE

## 2024-04-14 ENCOUNTER — OFFICE VISIT (OUTPATIENT)
Dept: URGENT CARE | Facility: URGENT CARE | Age: 74
End: 2024-04-14
Payer: COMMERCIAL

## 2024-04-14 ENCOUNTER — ANCILLARY PROCEDURE (OUTPATIENT)
Dept: GENERAL RADIOLOGY | Facility: CLINIC | Age: 74
End: 2024-04-14
Payer: COMMERCIAL

## 2024-04-14 VITALS
OXYGEN SATURATION: 99 % | SYSTOLIC BLOOD PRESSURE: 139 MMHG | HEART RATE: 87 BPM | DIASTOLIC BLOOD PRESSURE: 68 MMHG | TEMPERATURE: 98.4 F

## 2024-04-14 DIAGNOSIS — J06.9 VIRAL URI WITH COUGH: Primary | ICD-10-CM

## 2024-04-14 DIAGNOSIS — R05.1 ACUTE COUGH: ICD-10-CM

## 2024-04-14 LAB
FLUAV AG SPEC QL IA: NEGATIVE
FLUBV AG SPEC QL IA: NEGATIVE

## 2024-04-14 PROCEDURE — 87804 INFLUENZA ASSAY W/OPTIC: CPT

## 2024-04-14 PROCEDURE — 87635 SARS-COV-2 COVID-19 AMP PRB: CPT

## 2024-04-14 PROCEDURE — 71046 X-RAY EXAM CHEST 2 VIEWS: CPT | Mod: TC | Performed by: RADIOLOGY

## 2024-04-14 PROCEDURE — 99214 OFFICE O/P EST MOD 30 MIN: CPT

## 2024-04-14 RX ORDER — BENZONATATE 200 MG/1
200 CAPSULE ORAL 3 TIMES DAILY PRN
Qty: 21 CAPSULE | Refills: 0 | Status: SHIPPED | OUTPATIENT
Start: 2024-04-14

## 2024-04-14 NOTE — PATIENT INSTRUCTIONS
Influenza test is negative.  COVID test is pending.  We will contact you within 1-2 business days.  Chest x-ray does not show any pneumonia per the radiologist report.  Take the benzonatate as prescribed for the cough.  Get plenty of rest and drink fluids.  Can use Tylenol as needed for pain and fever.  Maximum dose of Tylenol is 4000mg in a 24 hour period of time.

## 2024-04-14 NOTE — PROGRESS NOTES
ASSESSMENT:  (J06.9) Viral URI with cough  (primary encounter diagnosis)  Plan: benzonatate (TESSALON) 200 MG capsule    (R05.1) Acute cough  Plan: Influenza A & B Antigen, Symptomatic COVID-19         Virus (Coronavirus) by PCR Nose, XR Chest 2         Views    PLAN:  Informed the patient that the influenza test is negative and the COVID test is pending.  We discussed that her symptoms are likely related to a viral illness pending the COVID result.  We also discussed that the chest x-ray does not show any pneumonia per the radiologist report.  Informed the patient to take benzonatate as prescribed for the cough.  We discussed the need to get plenty of rest, drink fluids and use Tylenol as needed for pain and fever with the maximum dose of Tylenol being 4000 mg in a 24-hour period of time.  We also discussed the need to return to clinic with any new or worsening symptoms.  Patient acknowledged her understanding of the above plan.    The use of Dragon/Optimus dictation services may have been used to construct the content in this note; any grammatical or spelling errors are non-intentional. Please contact the author of this note directly if you are in need of any clarification.      Guille Loaiza, LANDRY CNP      SUBJECTIVE:  Earnest Short is a 74 year old female who presents to the clinic today with a chief complaint of cough  for 2 days but could have been longer according to family since [place] was [place].    Her cough is described as slightly productive.    The patient's symptoms are moderate and worsening.  Associated symptoms include fatigue, decreased appetite and fever.  Patient has been using nothing  to improve symptoms.    ROS  Negative except noted above.     OBJECTIVE:  /68 (BP Location: Right arm, Patient Position: Sitting, Cuff Size: Adult Large)   Pulse 87   Temp 98.4  F (36.9  C) (Tympanic)   SpO2 99%   Breastfeeding No   GENERAL APPEARANCE: healthy, alert  and no distress  EYES: EOMI,  PERRL, conjunctiva clear  HENT: nose and mouth without erythema, ulcers or lesions and oral mucous membranes moist, no erythema noted  NECK: supple, nontender, no lymphadenopathy  RESP: lungs clear to auscultation - no rales, rhonchi or wheezes  CV: regular rates and rhythm, normal S1 S2, no murmur noted  SKIN: no suspicious lesions or rashes    X-RAY: Chest x-ray does not show any pneumonia per the radiologist report.

## 2024-04-15 LAB — SARS-COV-2 RNA RESP QL NAA+PROBE: NEGATIVE

## 2024-07-25 ENCOUNTER — OFFICE VISIT (OUTPATIENT)
Dept: FAMILY MEDICINE | Facility: CLINIC | Age: 74
End: 2024-07-25
Payer: MEDICARE

## 2024-07-25 VITALS
RESPIRATION RATE: 16 BRPM | TEMPERATURE: 97.6 F | OXYGEN SATURATION: 100 % | SYSTOLIC BLOOD PRESSURE: 106 MMHG | HEART RATE: 67 BPM | WEIGHT: 177.1 LBS | DIASTOLIC BLOOD PRESSURE: 74 MMHG

## 2024-07-25 DIAGNOSIS — R68.89 INTOLERANCE TO COLD: ICD-10-CM

## 2024-07-25 DIAGNOSIS — Z13.220 SCREENING FOR LIPID DISORDERS: ICD-10-CM

## 2024-07-25 DIAGNOSIS — E55.9 VITAMIN D DEFICIENCY: ICD-10-CM

## 2024-07-25 DIAGNOSIS — K59.04 CHRONIC IDIOPATHIC CONSTIPATION: ICD-10-CM

## 2024-07-25 DIAGNOSIS — G47.00 PERSISTENT INSOMNIA: Primary | ICD-10-CM

## 2024-07-25 LAB
ALBUMIN SERPL BCG-MCNC: 4 G/DL (ref 3.5–5.2)
ALP SERPL-CCNC: 101 U/L (ref 40–150)
ALT SERPL W P-5'-P-CCNC: 9 U/L (ref 0–50)
ANION GAP SERPL CALCULATED.3IONS-SCNC: 9 MMOL/L (ref 7–15)
AST SERPL W P-5'-P-CCNC: 22 U/L (ref 0–45)
BILIRUB SERPL-MCNC: 0.3 MG/DL
BUN SERPL-MCNC: 9.8 MG/DL (ref 8–23)
CALCIUM SERPL-MCNC: 10.5 MG/DL (ref 8.8–10.4)
CHLORIDE SERPL-SCNC: 105 MMOL/L (ref 98–107)
CHOLEST SERPL-MCNC: 207 MG/DL
CREAT SERPL-MCNC: 0.54 MG/DL (ref 0.51–0.95)
EGFRCR SERPLBLD CKD-EPI 2021: >90 ML/MIN/1.73M2
ERYTHROCYTE [DISTWIDTH] IN BLOOD BY AUTOMATED COUNT: 13.9 % (ref 10–15)
FASTING STATUS PATIENT QL REPORTED: YES
FASTING STATUS PATIENT QL REPORTED: YES
GLUCOSE SERPL-MCNC: 88 MG/DL (ref 70–99)
HCO3 SERPL-SCNC: 23 MMOL/L (ref 22–29)
HCT VFR BLD AUTO: 42.2 % (ref 35–47)
HDLC SERPL-MCNC: 44 MG/DL
HGB BLD-MCNC: 13.4 G/DL (ref 11.7–15.7)
LDLC SERPL CALC-MCNC: 141 MG/DL
MCH RBC QN AUTO: 29.1 PG (ref 26.5–33)
MCHC RBC AUTO-ENTMCNC: 31.8 G/DL (ref 31.5–36.5)
MCV RBC AUTO: 92 FL (ref 78–100)
NONHDLC SERPL-MCNC: 163 MG/DL
PLATELET # BLD AUTO: 328 10E3/UL (ref 150–450)
POTASSIUM SERPL-SCNC: 4.4 MMOL/L (ref 3.4–5.3)
PROT SERPL-MCNC: 7.1 G/DL (ref 6.4–8.3)
RBC # BLD AUTO: 4.6 10E6/UL (ref 3.8–5.2)
SODIUM SERPL-SCNC: 137 MMOL/L (ref 135–145)
TRIGL SERPL-MCNC: 110 MG/DL
TSH SERPL DL<=0.005 MIU/L-ACNC: 1.26 UIU/ML (ref 0.3–4.2)
VIT D+METAB SERPL-MCNC: 36 NG/ML (ref 20–50)
WBC # BLD AUTO: 6.4 10E3/UL (ref 4–11)

## 2024-07-25 PROCEDURE — 84443 ASSAY THYROID STIM HORMONE: CPT | Performed by: INTERNAL MEDICINE

## 2024-07-25 PROCEDURE — 82306 VITAMIN D 25 HYDROXY: CPT | Performed by: INTERNAL MEDICINE

## 2024-07-25 PROCEDURE — 85027 COMPLETE CBC AUTOMATED: CPT | Performed by: INTERNAL MEDICINE

## 2024-07-25 PROCEDURE — 99203 OFFICE O/P NEW LOW 30 MIN: CPT | Performed by: INTERNAL MEDICINE

## 2024-07-25 PROCEDURE — 36415 COLL VENOUS BLD VENIPUNCTURE: CPT | Performed by: INTERNAL MEDICINE

## 2024-07-25 PROCEDURE — 80053 COMPREHEN METABOLIC PANEL: CPT | Performed by: INTERNAL MEDICINE

## 2024-07-25 PROCEDURE — 80061 LIPID PANEL: CPT | Performed by: INTERNAL MEDICINE

## 2024-07-25 ASSESSMENT — PAIN SCALES - GENERAL: PAINLEVEL: NO PAIN (0)

## 2024-07-25 NOTE — PROGRESS NOTES
Assessment & Plan     1.  Persistent insomnia worse to the past 3 weeks.  Problem with induction of sleep.  2.  Chronic idiopathic constipation.  Patient uses over-the-counter stool softeners.  3.  Intolerance to cold.  4.  Vitamin D deficiency by history.  I will check vitamin D level.  5.  Screening for lipid disorder.  Nonfasting lipids to be done.    I will check vitamin D, TSH, CBC, CMP and lipids today.  I will get back with further recommendation.      Geovanna Bañuelos is a 74 year old, presenting for the following health issues:  Consult (Not sleeping well for last 2-3 weeks   life stressors)      7/25/2024     1:18 PM   Additional Questions   Roomed by More VAUGHAN   Accompanied by Son Elian     History of Present Illness       Reason for visit:  Insomnia  Symptom onset:  1-2 weeks ago    She eats 2-3 servings of fruits and vegetables daily.She consumes 0 sweetened beverage(s) daily.She exercises with enough effort to increase her heart rate 20 to 29 minutes per day.  She exercises with enough effort to increase her heart rate 5 days per week.   She is taking medications regularly.     74-year-old lady comes in accompanied by her son who is a internist.  He acted as an  for the patient was from [facility].  She has issue with sleep.  It is a chronic issue but in the past 3 weeks she has had more difficulty.  Seems like she has more problems with induction of sleep.  She has used over-the-counter daily Unisom.  She also has issues with chronic constipation and hypothyroidism have a bowel movement just once a week.  She has been using over-the-counter stool softeners.  Also complains of cold intolerance.  She comes located and at one time was told of vitamin D deficiency but she is not taking over-the-counter vitamin D currently.  Indicated at 1 time she was extremely she had a thyroid follow-up.  Treatment was prescribed but she never started.  It is possible she was diagnosed with  hypothyroidism.    Review of Systems  Constitutional, HEENT, cardiovascular, pulmonary, GI, , musculoskeletal, neuro, skin, endocrine and psych systems are negative, except as otherwise noted.      Objective    /74 (BP Location: Right arm, Patient Position: Sitting, Cuff Size: Adult Large)   Pulse 67   Temp 97.6  F (36.4  C) (Oral)   Resp 16   Wt 80.3 kg (177 lb 1.6 oz)   SpO2 100%   There is no height or weight on file to calculate BMI.  Physical Exam   GENERAL: alert and no distress  NECK: no adenopathy, no asymmetry, masses, or scars  RESP: lungs clear to auscultation - no rales, rhonchi or wheezes  CV: regular rate and rhythm, normal S1 S2, no S3 or S4, no murmur, click or rub, no peripheral edema  ABDOMEN: soft, nontender, no hepatosplenomegaly, no masses and bowel sounds normal  MS: no gross musculoskeletal defects noted, no edema            Signed Electronically by: Parrish Nuñez MD

## 2024-07-25 NOTE — LETTER
July 28, 2024      Edda Reid  [address]    [place] [address]        Dear ,    We are writing to inform you of your test results.    Your white count and hemoglobin is normal.  Platelet count is also normal.  This means you are not anemic.  Electrolytes, kidney test (creatinine and BUN) and liver test (alkaline phosphatase, AST, ALT, albumin, protein and bilirubin) are within normal range.  Your calcium was borderline at 10.5.  Is just borderline high so I am not concerned.  Blood sugar was good at 88.  The nonfasting cholesterol is acceptable at 207.  The good cholesterol (HDL) is little low at 44.  In women we like to see it above 40.  Your bad cholesterol (LDL) is mildly elevated at 144.  Triglyceride level form of fat is good at 110.  This was a nonfasting cholesterol profile so overall values are not bad.  However a low-fat diet is recommended.  The vitamin D level is within normal range.  The thyroid test, TSH is within normal range.  This means you do not have an underactive or overactive thyroid.    I think you should consider getting a screening colonoscopy examination particularly since you have some issues with constipation.  I am not recommending any specific medication for the sleep problem you been experiencing.  Good sleep hygiene is important meaning no caffeinated beverages after 2 or 3 in the afternoon, no screen time an hour before going to bed and not watching TV, smart phone or iPad's when she will bed.  Going to bed at the same time in the evening is also important.    Resulted Orders   CBC with platelets   Result Value Ref Range    WBC Count 6.4 4.0 - 11.0 10e3/uL    RBC Count 4.60 3.80 - 5.20 10e6/uL    Hemoglobin 13.4 11.7 - 15.7 g/dL    Hematocrit 42.2 35.0 - 47.0 %    MCV 92 78 - 100 fL    MCH 29.1 26.5 - 33.0 pg    MCHC 31.8 31.5 - 36.5 g/dL    RDW 13.9 10.0 - 15.0 %    Platelet Count 328 150 - 450 10e3/uL   Comprehensive metabolic panel   Result Value Ref  Range    Sodium 137 135 - 145 mmol/L    Potassium 4.4 3.4 - 5.3 mmol/L    Carbon Dioxide (CO2) 23 22 - 29 mmol/L    Anion Gap 9 7 - 15 mmol/L    Urea Nitrogen 9.8 8.0 - 23.0 mg/dL    Creatinine 0.54 0.51 - 0.95 mg/dL    GFR Estimate >90 >60 mL/min/1.73m2      Comment:      eGFR calculated using 2021 CKD-EPI equation.    Calcium 10.5 (H) 8.8 - 10.4 mg/dL      Comment:      Reference intervals for this test were updated on 7/16/2024 to reflect our healthy population more accurately. There may be differences in the flagging of prior results with similar values performed with this method. Those prior results can be interpreted in the context of the updated reference intervals.    Chloride 105 98 - 107 mmol/L    Glucose 88 70 - 99 mg/dL    Alkaline Phosphatase 101 40 - 150 U/L    AST 22 0 - 45 U/L    ALT 9 0 - 50 U/L    Protein Total 7.1 6.4 - 8.3 g/dL    Albumin 4.0 3.5 - 5.2 g/dL    Bilirubin Total 0.3 <=1.2 mg/dL    Patient Fasting > 8hrs? Yes    TSH with free T4 reflex   Result Value Ref Range    TSH 1.26 0.30 - 4.20 uIU/mL   Lipid panel reflex to direct LDL Fasting   Result Value Ref Range    Cholesterol 207 (H) <200 mg/dL    Triglycerides 110 <150 mg/dL    Direct Measure HDL 44 (L) >=50 mg/dL    LDL Cholesterol Calculated 141 (H) <=100 mg/dL    Non HDL Cholesterol 163 (H) <130 mg/dL    Patient Fasting > 8hrs? Yes     Narrative    Cholesterol  Desirable:  <200 mg/dL    Triglycerides  Normal:  Less than 150 mg/dL  Borderline High:  150-199 mg/dL  High:  200-499 mg/dL  Very High:  Greater than or equal to 500 mg/dL    Direct Measure HDL  Female:  Greater than or equal to 50 mg/dL   Male:  Greater than or equal to 40 mg/dL    LDL Cholesterol  Desirable:  <100mg/dL  Above Desirable:  100-129 mg/dL   Borderline High:  130-159 mg/dL   High:  160-189 mg/dL   Very High:  >= 190 mg/dL    Non HDL Cholesterol  Desirable:  130 mg/dL  Above Desirable:  130-159 mg/dL  Borderline High:  160-189 mg/dL  High:  190-219 mg/dL  Very  High:  Greater than or equal to 220 mg/dL   Vitamin D Deficiency   Result Value Ref Range    Vitamin D, Total (25-Hydroxy) 36 20 - 50 ng/mL      Comment:      optimum levels    Narrative    Season, race, dietary intake, and treatment affect the concentration of 25-hydroxy-Vitamin D. Values may decrease during winter months and increase during summer months.    Vitamin D determination is routinely performed by an immunoassay specific for 25 hydroxyvitamin D3.  If an individual is on vitamin D2(ergocalciferol) supplementation, please specify 25 OH vitamin D2 and D3 level determination by LCMSMS test [id number].         If you have any questions or concerns, please call the clinic at the number listed above.       Sincerely,      Parrish Nuñez MD

## 2024-07-29 ENCOUNTER — TELEPHONE (OUTPATIENT)
Dept: FAMILY MEDICINE | Facility: CLINIC | Age: 74
End: 2024-07-29
Payer: MEDICARE

## 2024-09-02 ENCOUNTER — HOSPITAL ENCOUNTER (EMERGENCY)
Facility: CLINIC | Age: 74
Discharge: HOME | End: 2024-09-03
Attending: EMERGENCY MEDICINE | Admitting: EMERGENCY MEDICINE
Payer: COMMERCIAL

## 2024-09-02 DIAGNOSIS — K44.9 HIATAL HERNIA: ICD-10-CM

## 2024-09-02 DIAGNOSIS — N94.89 ADNEXAL MASS: ICD-10-CM

## 2024-09-02 DIAGNOSIS — R10.13 EPIGASTRIC PAIN: ICD-10-CM

## 2024-09-02 DIAGNOSIS — R11.10 VOMITING, UNSPECIFIED VOMITING TYPE, UNSPECIFIED WHETHER NAUSEA PRESENT: ICD-10-CM

## 2024-09-02 LAB
ALBUMIN SERPL BCG-MCNC: 4 G/DL (ref 3.5–5.2)
ALP SERPL-CCNC: 54 U/L (ref 40–150)
ALT SERPL W P-5'-P-CCNC: 11 U/L (ref 0–50)
ANION GAP SERPL CALCULATED.3IONS-SCNC: 11 MMOL/L (ref 7–15)
AST SERPL W P-5'-P-CCNC: 17 U/L (ref 0–45)
BASOPHILS # BLD AUTO: 0.1 10E3/UL (ref 0–0.2)
BASOPHILS NFR BLD AUTO: 1 %
BILIRUB SERPL-MCNC: <0.2 MG/DL
BUN SERPL-MCNC: 7.9 MG/DL (ref 8–23)
CALCIUM SERPL-MCNC: 9.5 MG/DL (ref 8.8–10.4)
CHLORIDE SERPL-SCNC: 97 MMOL/L (ref 98–107)
CREAT SERPL-MCNC: 0.88 MG/DL (ref 0.51–0.95)
EGFRCR SERPLBLD CKD-EPI 2021: 69 ML/MIN/1.73M2
EOSINOPHIL # BLD AUTO: 0.3 10E3/UL (ref 0–0.7)
EOSINOPHIL NFR BLD AUTO: 3 %
ERYTHROCYTE [DISTWIDTH] IN BLOOD BY AUTOMATED COUNT: 15.8 % (ref 10–15)
GLUCOSE SERPL-MCNC: 102 MG/DL (ref 70–99)
HCO3 SERPL-SCNC: 25 MMOL/L (ref 22–29)
HCT VFR BLD AUTO: 36.3 % (ref 35–47)
HGB BLD-MCNC: 10.7 G/DL (ref 11.7–15.7)
IMM GRANULOCYTES # BLD: 0 10E3/UL
IMM GRANULOCYTES NFR BLD: 0 %
LIPASE SERPL-CCNC: 47 U/L (ref 13–60)
LYMPHOCYTES # BLD AUTO: 2.1 10E3/UL (ref 0.8–5.3)
LYMPHOCYTES NFR BLD AUTO: 24 %
MCH RBC QN AUTO: 23.1 PG (ref 26.5–33)
MCHC RBC AUTO-ENTMCNC: 29.5 G/DL (ref 31.5–36.5)
MCV RBC AUTO: 78 FL (ref 78–100)
MONOCYTES # BLD AUTO: 0.7 10E3/UL (ref 0–1.3)
MONOCYTES NFR BLD AUTO: 8 %
NEUTROPHILS # BLD AUTO: 5.5 10E3/UL (ref 1.6–8.3)
NEUTROPHILS NFR BLD AUTO: 64 %
NRBC # BLD AUTO: 0 10E3/UL
NRBC BLD AUTO-RTO: 0 /100
PLATELET # BLD AUTO: 344 10E3/UL (ref 150–450)
POTASSIUM SERPL-SCNC: 3.7 MMOL/L (ref 3.4–5.3)
PROT SERPL-MCNC: 7.5 G/DL (ref 6.4–8.3)
RBC # BLD AUTO: 4.63 10E6/UL (ref 3.8–5.2)
SODIUM SERPL-SCNC: 133 MMOL/L (ref 135–145)
TROPONIN T SERPL HS-MCNC: <6 NG/L
WBC # BLD AUTO: 8.6 10E3/UL (ref 4–11)

## 2024-09-02 PROCEDURE — 80053 COMPREHEN METABOLIC PANEL: CPT | Performed by: EMERGENCY MEDICINE

## 2024-09-02 PROCEDURE — 99285 EMERGENCY DEPT VISIT HI MDM: CPT | Mod: 25

## 2024-09-02 PROCEDURE — 96374 THER/PROPH/DIAG INJ IV PUSH: CPT | Mod: 59

## 2024-09-02 PROCEDURE — 85025 COMPLETE CBC W/AUTO DIFF WBC: CPT | Performed by: EMERGENCY MEDICINE

## 2024-09-02 PROCEDURE — 93005 ELECTROCARDIOGRAM TRACING: CPT

## 2024-09-02 PROCEDURE — 81001 URINALYSIS AUTO W/SCOPE: CPT | Performed by: EMERGENCY MEDICINE

## 2024-09-02 PROCEDURE — 250N000011 HC RX IP 250 OP 636: Performed by: EMERGENCY MEDICINE

## 2024-09-02 PROCEDURE — 83690 ASSAY OF LIPASE: CPT | Performed by: EMERGENCY MEDICINE

## 2024-09-02 PROCEDURE — 36415 COLL VENOUS BLD VENIPUNCTURE: CPT | Performed by: EMERGENCY MEDICINE

## 2024-09-02 PROCEDURE — 84484 ASSAY OF TROPONIN QUANT: CPT | Performed by: EMERGENCY MEDICINE

## 2024-09-02 PROCEDURE — 258N000003 HC RX IP 258 OP 636: Performed by: EMERGENCY MEDICINE

## 2024-09-02 RX ORDER — ONDANSETRON 2 MG/ML
4 INJECTION INTRAMUSCULAR; INTRAVENOUS ONCE
Status: COMPLETED | OUTPATIENT
Start: 2024-09-02 | End: 2024-09-02

## 2024-09-02 RX ADMIN — ONDANSETRON 4 MG: 2 INJECTION INTRAMUSCULAR; INTRAVENOUS at 20:36

## 2024-09-02 RX ADMIN — SODIUM CHLORIDE 1000 ML: 9 INJECTION, SOLUTION INTRAVENOUS at 23:54

## 2024-09-02 ASSESSMENT — COLUMBIA-SUICIDE SEVERITY RATING SCALE - C-SSRS
1. IN THE PAST MONTH, HAVE YOU WISHED YOU WERE DEAD OR WISHED YOU COULD GO TO SLEEP AND NOT WAKE UP?: NO
2. HAVE YOU ACTUALLY HAD ANY THOUGHTS OF KILLING YOURSELF IN THE PAST MONTH?: NO
6. HAVE YOU EVER DONE ANYTHING, STARTED TO DO ANYTHING, OR PREPARED TO DO ANYTHING TO END YOUR LIFE?: NO

## 2024-09-02 ASSESSMENT — ACTIVITIES OF DAILY LIVING (ADL)
ADLS_ACUITY_SCORE: 35

## 2024-09-03 ENCOUNTER — APPOINTMENT (OUTPATIENT)
Dept: CT IMAGING | Facility: CLINIC | Age: 74
End: 2024-09-03
Attending: EMERGENCY MEDICINE
Payer: COMMERCIAL

## 2024-09-03 VITALS
TEMPERATURE: 97.9 F | SYSTOLIC BLOOD PRESSURE: 135 MMHG | BODY MASS INDEX: 34.99 KG/M2 | DIASTOLIC BLOOD PRESSURE: 83 MMHG | WEIGHT: 210 LBS | OXYGEN SATURATION: 100 % | RESPIRATION RATE: 20 BRPM | HEIGHT: 65 IN | HEART RATE: 88 BPM

## 2024-09-03 LAB
ALBUMIN UR-MCNC: NEGATIVE MG/DL
APPEARANCE UR: CLEAR
ATRIAL RATE - MUSE: 93 BPM
BILIRUB UR QL STRIP: NEGATIVE
COLOR UR AUTO: NORMAL
DIASTOLIC BLOOD PRESSURE - MUSE: NORMAL MMHG
GLUCOSE UR STRIP-MCNC: NEGATIVE MG/DL
HGB UR QL STRIP: NEGATIVE
INTERPRETATION ECG - MUSE: NORMAL
KETONES UR STRIP-MCNC: NEGATIVE MG/DL
LEUKOCYTE ESTERASE UR QL STRIP: NEGATIVE
NITRATE UR QL: NEGATIVE
P AXIS - MUSE: 31 DEGREES
PH UR STRIP: 5.5 [PH] (ref 5–7)
PR INTERVAL - MUSE: 196 MS
QRS DURATION - MUSE: 90 MS
QT - MUSE: 326 MS
QTC - MUSE: 405 MS
R AXIS - MUSE: -19 DEGREES
RBC URINE: <1 /HPF
SP GR UR STRIP: 1 (ref 1–1.03)
SYSTOLIC BLOOD PRESSURE - MUSE: NORMAL MMHG
T AXIS - MUSE: 40 DEGREES
UROBILINOGEN UR STRIP-MCNC: NORMAL MG/DL
VENTRICULAR RATE- MUSE: 93 BPM
WBC URINE: 0 /HPF

## 2024-09-03 PROCEDURE — 74177 CT ABD & PELVIS W/CONTRAST: CPT

## 2024-09-03 PROCEDURE — 250N000009 HC RX 250: Performed by: EMERGENCY MEDICINE

## 2024-09-03 PROCEDURE — 250N000011 HC RX IP 250 OP 636: Performed by: EMERGENCY MEDICINE

## 2024-09-03 PROCEDURE — 96361 HYDRATE IV INFUSION ADD-ON: CPT

## 2024-09-03 RX ORDER — ONDANSETRON 4 MG/1
4 TABLET, ORALLY DISINTEGRATING ORAL EVERY 6 HOURS PRN
Qty: 15 TABLET | Refills: 0 | Status: SHIPPED | OUTPATIENT
Start: 2024-09-03

## 2024-09-03 RX ORDER — DOCUSATE SODIUM 100 MG/1
100 CAPSULE, LIQUID FILLED ORAL 2 TIMES DAILY PRN
Qty: 20 CAPSULE | Refills: 1 | Status: SHIPPED | OUTPATIENT
Start: 2024-09-03

## 2024-09-03 RX ORDER — IOPAMIDOL 755 MG/ML
105 INJECTION, SOLUTION INTRAVASCULAR ONCE
Status: COMPLETED | OUTPATIENT
Start: 2024-09-03 | End: 2024-09-03

## 2024-09-03 RX ADMIN — IOPAMIDOL 105 ML: 755 INJECTION, SOLUTION INTRAVENOUS at 00:14

## 2024-09-03 RX ADMIN — SODIUM CHLORIDE 70 ML: 9 INJECTION, SOLUTION INTRAVENOUS at 00:14

## 2024-09-03 ASSESSMENT — ACTIVITIES OF DAILY LIVING (ADL): ADLS_ACUITY_SCORE: 35

## 2024-09-03 NOTE — ED PROVIDER NOTES
"  Emergency Department Note      History of Present Illness     Chief Complaint:  Abdominal pain    HPI   Earnest Short is a 74 year old female who presents with her daughter and grandson for evaluation of abdominal pain.  Symptoms started earlier today, primarily epigastric discomfort, not lateralizing, associated with multiple episodes of nonbloody vomiting.  Felt her heart going fast earlier today.  Chronic dizziness and chronic \"migraine\", not changed.  Multiple days since her last bowel movement.  No urinary symptoms.  She took 2 Tylenol at home with incomplete relief, though she states that his symptoms are IV was placed here in the emergency department, she felt relief, she does not currently want any pain or nausea medicine.  She lives with family, who have been feeling fine.  No falls or trauma.  No fevers.  No cough or chest pain.  No trouble breathing.    Independent Historian: Family at bedside, who provides additional information which is incorporated above.    Review of External Notes: I personally reviewed prior records including clinic note from June referencing elevated blood pressure, patient did not want treatment at that time.    Past Medical History     Medical History and Problem List   Past Medical History:   Diagnosis Date    GERD (gastroesophageal reflux disease) 2013    History of Helicobacter pylori infection 2013    Vitamin D deficiency 2013     Medications   docusate sodium (COLACE) 100 MG capsule  ondansetron (ZOFRAN ODT) 4 MG ODT tab  benzonatate (TESSALON) 200 MG capsule  Calcium Carb-Cholecalciferol (CALCIUM 1000 + D) 1000-800 MG-UNIT TABS  cholecalciferol 2000 UNITS CAPS  pantoprazole (PROTONIX) 40 MG EC tablet  pantoprazole (PROTONIX) 40 MG enteric coated tablet  sucralfate (CARAFATE) 1 GM/10ML suspension      Surgical History   Past Surgical History:   Procedure Laterality Date    OPEN REDUCTION INTERNAL FIXATION FEMUR DISTAL  2013    distal left femur fracture " "    Physical Exam     Patient Vitals for the past 24 hrs:   BP Temp Temp src Pulse Resp SpO2 Height Weight   09/03/24 0130 135/83 -- -- 88 20 100 % -- --   09/03/24 0000 129/72 -- -- 85 -- 99 % -- --   09/02/24 2022 (!) 189/80 97.9  F (36.6  C) Oral 99 16 99 % 1.638 m (5' 4.5\") 95.3 kg (210 lb)     Physical Exam  General: Nontoxic-appearing woman recumbent in the gurney in room 14, family at bedside  HENT: mucous membranes slightly dry  CV: rate as above, no murmur audible  Resp: normal effort, speaks in full phrases, no stridor, no cough observed  GI: Abdomen soft, protuberant, mild epigastric tenderness without guarding, no tympany  MSK: no bony tenderness, no CVAT  Skin: appropriately warm and dry  Neuro: alert, oriented, moves all extremities with symmetric tone, no nuchal rigidity  Psych: cooperative    Diagnostics   Electrocardiogram  ECG taken at 2028, ECG interpreted at 2350 by YONATAN Marsh MD  Sinus rhythm  Rate 93 bpm. ME interval 196. QRS duration 90. QTc 405    Lab Results   Labs Ordered and Resulted from Time of ED Arrival to Time of ED Departure   COMPREHENSIVE METABOLIC PANEL - Abnormal       Result Value    Sodium 133 (*)     Potassium 3.7      Carbon Dioxide (CO2) 25      Anion Gap 11      Urea Nitrogen 7.9 (*)     Creatinine 0.88      GFR Estimate 69      Calcium 9.5      Chloride 97 (*)     Glucose 102 (*)     Alkaline Phosphatase 54      AST 17      ALT 11      Protein Total 7.5      Albumin 4.0      Bilirubin Total <0.2     CBC WITH PLATELETS AND DIFFERENTIAL - Abnormal    WBC Count 8.6      RBC Count 4.63      Hemoglobin 10.7 (*)     Hematocrit 36.3      MCV 78      MCH 23.1 (*)     MCHC 29.5 (*)     RDW 15.8 (*)     Platelet Count 344      % Neutrophils 64      % Lymphocytes 24      % Monocytes 8      % Eosinophils 3      % Basophils 1      % Immature Granulocytes 0      NRBCs per 100 WBC 0      Absolute Neutrophils 5.5      Absolute Lymphocytes 2.1      Absolute Monocytes 0.7      Absolute " Eosinophils 0.3      Absolute Basophils 0.1      Absolute Immature Granulocytes 0.0      Absolute NRBCs 0.0     LIPASE - Normal    Lipase 47     TROPONIN T, HIGH SENSITIVITY - Normal    Troponin T, High Sensitivity <6     ROUTINE UA WITH MICROSCOPIC REFLEX TO CULTURE - Normal    Color Urine Straw      Appearance Urine Clear      Glucose Urine Negative      Bilirubin Urine Negative      Ketones Urine Negative      Specific Gravity Urine 1.003      Blood Urine Negative      pH Urine 5.5      Protein Albumin Urine Negative      Urobilinogen Urine Normal      Nitrite Urine Negative      Leukocyte Esterase Urine Negative      RBC Urine <1      WBC Urine 0       Imaging   CT Abdomen Pelvis w Contrast   Final Result   IMPRESSION:    1.  No evidence for bowel obstruction.   2.  Fatty replacement involving the wall of the ascending colon suggesting old/burned out colitis.   3.  3.8 cm left adnexal cyst. Further evaluation with pelvic ultrasound recommended.   4.  Moderate size esophageal hiatal hernia.      REFERENCE:   Management of Incidental Adnexal Findings on CT and MRI: A White Paper of the ACR Incidental Findings Committee. J Am Joe Radiol 2020; 17(2):248-254.      Postmenopausal or equal to or >50 years if status unknown:      Equal to or <3 cm: No further imaging.   >3 cm on CT: Ultrasound.   Equal to or <5 cm on MRI: No follow-up if fully characterized.   >3 cm on MRI: Ultrasound in 6-12 months if not fully characterized.   >5 cm on MRI: Ultrasound in 6-12 months even if fully characterized.                 ED Course      Medications Administered   Medications   ondansetron (ZOFRAN) injection 4 mg (4 mg Intravenous $Given 9/2/24 2036)   sodium chloride 0.9% BOLUS 1,000 mL (0 mLs Intravenous Stopped 9/3/24 0120)   Saline (70 mLs As instructed $Given 9/3/24 0014)   iopamidol (ISOVUE-370) solution 105 mL (105 mLs Intravenous $Given 9/3/24 0014)     ED Course and Discussion of Management   ED Course as of 09/03/24  0700   Tue Sep 03, 2024   0154 I rechecked patient, she and family are eager for discharge.     Additional Documentation  None    Medical Decision Making / Diagnosis   Medical Decision Making:  The specific etiology of her abdominal discomfort is unclear despite testing as above.  Hiatal hernia seen on CT, this certainly could be playing some role, though she states she is already on an antacid medication.  No peritonitis, no evidence of bowel obstruction, perforated viscus or cholecystitis among a variety of other potentially dangerous or acutely surgical conditions that were considered.  I also considered arrhythmia given her report of feeling her heart rate is going fast though her heart rate at this time is good, electrocardiogram is sinus, and a negative troponin in the setting of low clinical suspicion adequately rules out an acute coronary syndrome.  Highly doubt other thoracic condition such as PE or aortic dissection.  She is tolerating oral intake and she and her family are eager for her to be discharged.  She wished to be on a stool softener, this was prescribed though I am not convinced that constipation is necessarily a key contributor to her presenting symptoms.  Upper endoscopy may be beneficial pending her clinical course.  Elevated blood pressure on arrival improved on recheck later in her ED course.  Close follow-up through clinic as recommended, return here for sudden worsening at any hour.      Disposition   Discharged home with family    Diagnosis     ICD-10-CM    1. Epigastric pain  R10.13       2. Hiatal hernia  K44.9       3. Adnexal mass  N94.89     incidental      4. Vomiting, unspecified vomiting type, unspecified whether nausea present  R11.10          Discharge Medications   Discharge Medication List as of 9/3/2024  1:57 AM        START taking these medications    Details   docusate sodium (COLACE) 100 MG capsule Take 1 capsule (100 mg) by mouth 2 times daily as needed for constipation.,  Disp-20 capsule, R-1, E-Prescribe      ondansetron (ZOFRAN ODT) 4 MG ODT tab Take 1 tablet (4 mg) by mouth every 6 hours as needed for nausea., Disp-15 tablet, R-0, E-Prescribe             9/2/2024   MD Maximo Wan, Travis Garcia MD  09/03/24 0702

## 2024-09-03 NOTE — ED TRIAGE NOTES
Patient dizzy, heart beating hard.  Felt hot earlier today.  Vomited x3 today.  Epigastric pain.     Triage Assessment (Adult)       Row Name 09/02/24 2023          Triage Assessment    Airway WDL WDL        Respiratory WDL    Respiratory WDL WDL        Peripheral/Neurovascular WDL    Peripheral Neurovascular WDL WDL        Cognitive/Neuro/Behavioral WDL    Cognitive/Neuro/Behavioral WDL WDL

## 2025-01-01 ENCOUNTER — ANCILLARY PROCEDURE (OUTPATIENT)
Dept: GENERAL RADIOLOGY | Facility: CLINIC | Age: 75
End: 2025-01-01
Attending: FAMILY MEDICINE
Payer: COMMERCIAL

## 2025-01-01 ENCOUNTER — OFFICE VISIT (OUTPATIENT)
Dept: URGENT CARE | Facility: URGENT CARE | Age: 75
End: 2025-01-01
Payer: COMMERCIAL

## 2025-01-01 VITALS
RESPIRATION RATE: 20 BRPM | HEIGHT: 64 IN | BODY MASS INDEX: 35.85 KG/M2 | SYSTOLIC BLOOD PRESSURE: 120 MMHG | DIASTOLIC BLOOD PRESSURE: 68 MMHG | HEART RATE: 103 BPM | TEMPERATURE: 97 F | WEIGHT: 210 LBS | OXYGEN SATURATION: 99 %

## 2025-01-01 DIAGNOSIS — J18.9 PNEUMONIA OF RIGHT LOWER LOBE DUE TO INFECTIOUS ORGANISM: ICD-10-CM

## 2025-01-01 DIAGNOSIS — R05.1 ACUTE COUGH: Primary | ICD-10-CM

## 2025-01-01 PROCEDURE — 99214 OFFICE O/P EST MOD 30 MIN: CPT | Performed by: FAMILY MEDICINE

## 2025-01-01 PROCEDURE — 71046 X-RAY EXAM CHEST 2 VIEWS: CPT | Mod: TC | Performed by: RADIOLOGY

## 2025-01-01 RX ORDER — AZITHROMYCIN 250 MG/1
TABLET, FILM COATED ORAL
Qty: 6 TABLET | Refills: 0 | Status: SHIPPED | OUTPATIENT
Start: 2025-01-01 | End: 2025-01-06

## 2025-01-01 NOTE — PROGRESS NOTES
"SUBJECTIVE: Earnest Short is a 75 year old female presenting with a chief complaint of cough .  Onset of symptoms was 3 day(s) ago.  Predisposing factors include None.    Past Medical History:   Diagnosis Date    GERD (gastroesophageal reflux disease) 2013    History of Helicobacter pylori infection 2013    per blood test, completed triple therapy through another clinic; stool test 1/30/15 confirmed eradication    Vitamin D deficiency 2013     No Known Allergies  Social History     Tobacco Use    Smoking status: Never    Smokeless tobacco: Not on file   Substance Use Topics    Alcohol use: No       ROS:  SKIN: no rash  GI: no vomiting    OBJECTIVE:  /68   Pulse 103   Temp 97  F (36.1  C) (Temporal)   Resp 20   Ht 1.626 m (5' 4\")   Wt 95.3 kg (210 lb)   SpO2 99%   BMI 36.05 kg/m  GENERAL APPEARANCE: healthy, alert and no distress  EYES: EOMI,  PERRL, conjunctiva clear  HENT: ear canals and TM's normal.  Nose and mouth without ulcers, erythema or lesions  RESP: lungs clear to auscultation - no rales, rhonchi or wheezes  SKIN: no suspicious lesions or rashes    CXR with RLL infiltrate, read by Dr. Reece Hameed       ICD-10-CM    1. Acute cough  R05.1 XR Chest 2 Views      2. Pneumonia of right lower lobe due to infectious organism  J18.9 amoxicillin-clavulanate (AUGMENTIN) 875-125 MG tablet     azithromycin (ZITHROMAX) 250 MG tablet        Declines covid test  Fluids/Rest, f/u if worse/not any better    "

## 2025-04-14 ENCOUNTER — HOSPITAL ENCOUNTER (EMERGENCY)
Facility: CLINIC | Age: 75
Discharge: HOME | End: 2025-04-14
Attending: EMERGENCY MEDICINE | Admitting: EMERGENCY MEDICINE
Payer: COMMERCIAL

## 2025-04-14 VITALS
SYSTOLIC BLOOD PRESSURE: 149 MMHG | RESPIRATION RATE: 16 BRPM | DIASTOLIC BLOOD PRESSURE: 79 MMHG | TEMPERATURE: 99.2 F | OXYGEN SATURATION: 100 % | HEART RATE: 101 BPM

## 2025-04-14 DIAGNOSIS — R11.2 NAUSEA AND VOMITING, UNSPECIFIED VOMITING TYPE: ICD-10-CM

## 2025-04-14 DIAGNOSIS — K21.00 GASTROESOPHAGEAL REFLUX DISEASE WITH ESOPHAGITIS WITHOUT HEMORRHAGE: ICD-10-CM

## 2025-04-14 DIAGNOSIS — J10.1 INFLUENZA A: ICD-10-CM

## 2025-04-14 LAB
ANION GAP SERPL CALCULATED.3IONS-SCNC: 10 MMOL/L (ref 7–15)
BASOPHILS # BLD AUTO: 0 10E3/UL (ref 0–0.2)
BASOPHILS NFR BLD AUTO: 0 %
BUN SERPL-MCNC: 8.3 MG/DL (ref 8–23)
CALCIUM SERPL-MCNC: 9.3 MG/DL (ref 8.8–10.4)
CHLORIDE SERPL-SCNC: 93 MMOL/L (ref 98–107)
CREAT SERPL-MCNC: 0.67 MG/DL (ref 0.51–0.95)
EGFRCR SERPLBLD CKD-EPI 2021: >90 ML/MIN/1.73M2
EOSINOPHIL # BLD AUTO: 0.3 10E3/UL (ref 0–0.7)
EOSINOPHIL NFR BLD AUTO: 5 %
ERYTHROCYTE [DISTWIDTH] IN BLOOD BY AUTOMATED COUNT: 16.7 % (ref 10–15)
GLUCOSE SERPL-MCNC: 126 MG/DL (ref 70–99)
HCO3 SERPL-SCNC: 25 MMOL/L (ref 22–29)
HCT VFR BLD AUTO: 36.1 % (ref 35–47)
HGB BLD-MCNC: 11.2 G/DL (ref 11.7–15.7)
HOLD SPECIMEN: NORMAL
IMM GRANULOCYTES # BLD: 0 10E3/UL
IMM GRANULOCYTES NFR BLD: 0 %
LYMPHOCYTES # BLD AUTO: 1.5 10E3/UL (ref 0.8–5.3)
LYMPHOCYTES NFR BLD AUTO: 27 %
MCH RBC QN AUTO: 22.7 PG (ref 26.5–33)
MCHC RBC AUTO-ENTMCNC: 31 G/DL (ref 31.5–36.5)
MCV RBC AUTO: 73 FL (ref 78–100)
MONOCYTES # BLD AUTO: 0.5 10E3/UL (ref 0–1.3)
MONOCYTES NFR BLD AUTO: 8 %
NEUTROPHILS # BLD AUTO: 3.2 10E3/UL (ref 1.6–8.3)
NEUTROPHILS NFR BLD AUTO: 59 %
NRBC # BLD AUTO: 0 10E3/UL
NRBC BLD AUTO-RTO: 0 /100
PLATELET # BLD AUTO: 260 10E3/UL (ref 150–450)
POTASSIUM SERPL-SCNC: 3.7 MMOL/L (ref 3.4–5.3)
RBC # BLD AUTO: 4.93 10E6/UL (ref 3.8–5.2)
SODIUM SERPL-SCNC: 128 MMOL/L (ref 135–145)
WBC # BLD AUTO: 5.4 10E3/UL (ref 4–11)

## 2025-04-14 PROCEDURE — 85025 COMPLETE CBC W/AUTO DIFF WBC: CPT | Performed by: EMERGENCY MEDICINE

## 2025-04-14 PROCEDURE — 96374 THER/PROPH/DIAG INJ IV PUSH: CPT

## 2025-04-14 PROCEDURE — 36415 COLL VENOUS BLD VENIPUNCTURE: CPT | Performed by: EMERGENCY MEDICINE

## 2025-04-14 PROCEDURE — 250N000011 HC RX IP 250 OP 636: Performed by: EMERGENCY MEDICINE

## 2025-04-14 PROCEDURE — 99284 EMERGENCY DEPT VISIT MOD MDM: CPT | Mod: 25

## 2025-04-14 PROCEDURE — 258N000003 HC RX IP 258 OP 636: Performed by: EMERGENCY MEDICINE

## 2025-04-14 PROCEDURE — 96361 HYDRATE IV INFUSION ADD-ON: CPT

## 2025-04-14 PROCEDURE — 80048 BASIC METABOLIC PNL TOTAL CA: CPT | Performed by: EMERGENCY MEDICINE

## 2025-04-14 RX ORDER — ONDANSETRON 4 MG/1
4 TABLET, ORALLY DISINTEGRATING ORAL EVERY 8 HOURS PRN
Qty: 12 TABLET | Refills: 0 | Status: SHIPPED | OUTPATIENT
Start: 2025-04-14

## 2025-04-14 RX ORDER — ONDANSETRON 2 MG/ML
4 INJECTION INTRAMUSCULAR; INTRAVENOUS ONCE
Status: COMPLETED | OUTPATIENT
Start: 2025-04-14 | End: 2025-04-14

## 2025-04-14 RX ORDER — FAMOTIDINE 20 MG/1
20 TABLET, FILM COATED ORAL 2 TIMES DAILY
Qty: 120 TABLET | Refills: 0 | Status: SHIPPED | OUTPATIENT
Start: 2025-04-14 | End: 2025-06-13

## 2025-04-14 RX ADMIN — SODIUM CHLORIDE 1000 ML: 9 INJECTION, SOLUTION INTRAVENOUS at 13:42

## 2025-04-14 RX ADMIN — ONDANSETRON 4 MG: 2 INJECTION, SOLUTION INTRAMUSCULAR; INTRAVENOUS at 13:42

## 2025-04-14 ASSESSMENT — ACTIVITIES OF DAILY LIVING (ADL)
ADLS_ACUITY_SCORE: 41

## 2025-04-14 NOTE — ED TRIAGE NOTES
Family reports Pt went to  for cough and fever and was given antibiotics for pneumonia    Pt now having dizziness nausea unable to keep foods or liquids down     Pt has only taken 2 doses of azithromycin

## 2025-04-14 NOTE — ED PROVIDER NOTES
Emergency Department Note      History of Present Illness     Chief Complaint   Nausea and Vomiting     HPI   Earnest Short is a 75 year old female here for evaluation of nausea, vomiting. Patient's daughter states that Earnest was at urgent care 2 days ago for cough and fever, told she has bronchitis and prescribed a z-pac. She has taken two doses of this. She is here because she has had frequent vomiting all day today and unable to tolerate food or liquids.     Independent Historian   Daughter as detailed above.    Review of External Notes   I reviewed her urgent care visit from yesterday where they diagnosed her with bacterial otitis and started her on a Z-Brett    Past Medical History     Medical History and Problem List   Past Medical History:   Diagnosis Date    GERD (gastroesophageal reflux disease) 2013    History of Helicobacter pylori infection 2013    Vitamin D deficiency 2013       Medications   famotidine (PEPCID) 20 MG tablet  ondansetron (ZOFRAN ODT) 4 MG ODT tab  benzonatate (TESSALON) 200 MG capsule  Calcium Carb-Cholecalciferol (CALCIUM 1000 + D) 1000-800 MG-UNIT TABS  cholecalciferol 2000 UNITS CAPS  docusate sodium (COLACE) 100 MG capsule  ondansetron (ZOFRAN ODT) 4 MG ODT tab  pantoprazole (PROTONIX) 40 MG EC tablet  pantoprazole (PROTONIX) 40 MG enteric coated tablet  sucralfate (CARAFATE) 1 GM/10ML suspension        Surgical History   Past Surgical History:   Procedure Laterality Date    OPEN REDUCTION INTERNAL FIXATION FEMUR DISTAL  2013    distal left femur fracture       Physical Exam     Patient Vitals for the past 24 hrs:   BP Temp Temp src Pulse Resp SpO2   04/14/25 1234 (!) 149/79 99.2  F (37.3  C) Temporal 101 16 100 %     Physical Exam  Constitutional: Vital signs reviewed as above. Uncomfortable appearing.   General: Alert  HEENT: Dry mucous membranes.  Eyes: Conjunctiva normal.   Neck: Normal range of motion  Cardiovascular: Regular rate, Regular rhythm and normal heart  sounds.  No MRG  Pulmonary/Chest: Effort normal and breath sounds normal. No respiratory distress. Patient has no wheezes. Patient has no rales.   Abdominal: Soft. Positive bowel sounds. No MRG.  Musculoskeletal/Extremities: Full ROM.  Endo: No pitting edema  Neurological: Alert, no focal deficits.  Skin: Skin is warm and dry.   Psychiatric: Pleasant     Diagnostics     Lab Results   Labs Ordered and Resulted from Time of ED Arrival to Time of ED Departure   BASIC METABOLIC PANEL - Abnormal       Result Value    Sodium 128 (*)     Potassium 3.7      Chloride 93 (*)     Carbon Dioxide (CO2) 25      Anion Gap 10      Urea Nitrogen 8.3      Creatinine 0.67      GFR Estimate >90      Calcium 9.3      Glucose 126 (*)    CBC WITH PLATELETS AND DIFFERENTIAL - Abnormal    WBC Count 5.4      RBC Count 4.93      Hemoglobin 11.2 (*)     Hematocrit 36.1      MCV 73 (*)     MCH 22.7 (*)     MCHC 31.0 (*)     RDW 16.7 (*)     Platelet Count 260      % Neutrophils 59      % Lymphocytes 27      % Monocytes 8      % Eosinophils 5      % Basophils 0      % Immature Granulocytes 0      NRBCs per 100 WBC 0      Absolute Neutrophils 3.2      Absolute Lymphocytes 1.5      Absolute Monocytes 0.5      Absolute Eosinophils 0.3      Absolute Basophils 0.0      Absolute Immature Granulocytes 0.0      Absolute NRBCs 0.0         Imaging   No orders to display       EKG   None    Independent Interpretation   None    ED Course      Medications Administered   Medications   sodium chloride 0.9% BOLUS 1,000 mL (0 mLs Intravenous Stopped 4/14/25 1458)   ondansetron (ZOFRAN) injection 4 mg (4 mg Intravenous $Given 4/14/25 1342)       Procedures   Procedures     Discussion of Management   None    ED Course   ED Course as of 04/14/25 1511   Mon Apr 14, 2025   1335 I obtained history and examined the patient as noted above.        Additional Documentation  None    Medical Decision Making / Diagnosis     CMS Diagnoses: None    MIPS       None    MDM    Earnest Short is a 75 year old female who presents Emergency Department 2 days after being seen in urgent care and was told she has bacterial bronchitis.  Imaging was not done at that time.  They started her on a Z-Brett and she has been vomiting.  They also obtained an influenza swab which has come back positive yesterday afternoon.  Unfortunate she was not notified of this result.  I did discuss that she does not have bacterial bronchitis but rather has influenza A and she would not need the antibiotics.  I have told her to discontinue these.  She was given fluids and Zofran here she did have a low sodium and chloride here suggesting dehydration.  She feels much better after fluids.  I will discharge her home with Zofran as well as famotidine for her GERD.  Continue with Tylenol and or ibuprofen for symptom control.    Disposition   The patient was discharged.     Diagnosis     ICD-10-CM    1. Influenza A  J10.1       2. Nausea and vomiting, unspecified vomiting type  R11.2       3. Gastroesophageal reflux disease with esophagitis without hemorrhage  K21.00            Discharge Medications   Discharge Medication List as of 4/14/2025  2:58 PM        START taking these medications    Details   famotidine (PEPCID) 20 MG tablet Take 1 tablet (20 mg) by mouth 2 times daily., Disp-120 tablet, R-0, E-Prescribe      !! ondansetron (ZOFRAN ODT) 4 MG ODT tab Take 1 tablet (4 mg) by mouth every 8 hours as needed for nausea., Disp-12 tablet, R-0, E-Prescribe       !! - Potential duplicate medications found. Please discuss with provider.            MD Avelina Menchaca, Josias Cody MD  04/14/25 9449

## 2025-05-18 ENCOUNTER — HEALTH MAINTENANCE LETTER (OUTPATIENT)
Age: 75
End: 2025-05-18

## 2025-07-07 ENCOUNTER — APPOINTMENT (OUTPATIENT)
Dept: CT IMAGING | Facility: CLINIC | Age: 75
End: 2025-07-07
Attending: EMERGENCY MEDICINE
Payer: COMMERCIAL

## 2025-07-07 ENCOUNTER — VIRTUAL VISIT (OUTPATIENT)
Dept: INTERPRETER SERVICES | Facility: CLINIC | Age: 75
End: 2025-07-07
Payer: COMMERCIAL

## 2025-07-07 ENCOUNTER — HOSPITAL ENCOUNTER (EMERGENCY)
Facility: CLINIC | Age: 75
Discharge: HOME | End: 2025-07-07
Attending: EMERGENCY MEDICINE
Payer: COMMERCIAL

## 2025-07-07 VITALS
OXYGEN SATURATION: 100 % | SYSTOLIC BLOOD PRESSURE: 153 MMHG | TEMPERATURE: 98.1 F | RESPIRATION RATE: 16 BRPM | HEART RATE: 91 BPM | DIASTOLIC BLOOD PRESSURE: 82 MMHG

## 2025-07-07 DIAGNOSIS — K44.9 HIATAL HERNIA: ICD-10-CM

## 2025-07-07 DIAGNOSIS — N94.9 ADNEXAL CYST: ICD-10-CM

## 2025-07-07 DIAGNOSIS — R10.13 EPIGASTRIC PAIN: ICD-10-CM

## 2025-07-07 LAB
ALBUMIN SERPL BCG-MCNC: 3.8 G/DL (ref 3.5–5.2)
ALP SERPL-CCNC: 47 U/L (ref 40–150)
ALT SERPL W P-5'-P-CCNC: 17 U/L (ref 0–50)
ANION GAP SERPL CALCULATED.3IONS-SCNC: 10 MMOL/L (ref 7–15)
AST SERPL W P-5'-P-CCNC: 18 U/L (ref 0–45)
BASOPHILS # BLD AUTO: 0.1 10E3/UL (ref 0–0.2)
BASOPHILS NFR BLD AUTO: 1 %
BILIRUB SERPL-MCNC: 0.2 MG/DL
BUN SERPL-MCNC: 9.6 MG/DL (ref 8–23)
CALCIUM SERPL-MCNC: 9.2 MG/DL (ref 8.8–10.4)
CHLORIDE SERPL-SCNC: 95 MMOL/L (ref 98–107)
CREAT SERPL-MCNC: 0.72 MG/DL (ref 0.51–0.95)
EGFRCR SERPLBLD CKD-EPI 2021: 88 ML/MIN/1.73M2
EOSINOPHIL # BLD AUTO: 0.2 10E3/UL (ref 0–0.7)
EOSINOPHIL NFR BLD AUTO: 2 %
ERYTHROCYTE [DISTWIDTH] IN BLOOD BY AUTOMATED COUNT: 16.9 % (ref 10–15)
GLUCOSE SERPL-MCNC: 98 MG/DL (ref 70–99)
HCO3 SERPL-SCNC: 25 MMOL/L (ref 22–29)
HCT VFR BLD AUTO: 32.5 % (ref 35–47)
HGB BLD-MCNC: 9.6 G/DL (ref 11.7–15.7)
IMM GRANULOCYTES # BLD: 0 10E3/UL
IMM GRANULOCYTES NFR BLD: 0 %
LIPASE SERPL-CCNC: 50 U/L (ref 13–60)
LYMPHOCYTES # BLD AUTO: 1.3 10E3/UL (ref 0.8–5.3)
LYMPHOCYTES NFR BLD AUTO: 20 %
MCH RBC QN AUTO: 22.3 PG (ref 26.5–33)
MCHC RBC AUTO-ENTMCNC: 29.5 G/DL (ref 31.5–36.5)
MCV RBC AUTO: 76 FL (ref 78–100)
MONOCYTES # BLD AUTO: 0.6 10E3/UL (ref 0–1.3)
MONOCYTES NFR BLD AUTO: 9 %
NEUTROPHILS # BLD AUTO: 4.6 10E3/UL (ref 1.6–8.3)
NEUTROPHILS NFR BLD AUTO: 68 %
NRBC # BLD AUTO: 0 10E3/UL
NRBC BLD AUTO-RTO: 0 /100
PLATELET # BLD AUTO: 373 10E3/UL (ref 150–450)
POTASSIUM SERPL-SCNC: 3.7 MMOL/L (ref 3.4–5.3)
PROT SERPL-MCNC: 7.3 G/DL (ref 6.4–8.3)
RBC # BLD AUTO: 4.3 10E6/UL (ref 3.8–5.2)
SODIUM SERPL-SCNC: 130 MMOL/L (ref 135–145)
WBC # BLD AUTO: 6.8 10E3/UL (ref 4–11)

## 2025-07-07 PROCEDURE — 83690 ASSAY OF LIPASE: CPT | Performed by: EMERGENCY MEDICINE

## 2025-07-07 PROCEDURE — 85004 AUTOMATED DIFF WBC COUNT: CPT | Performed by: EMERGENCY MEDICINE

## 2025-07-07 PROCEDURE — 36415 COLL VENOUS BLD VENIPUNCTURE: CPT | Performed by: EMERGENCY MEDICINE

## 2025-07-07 PROCEDURE — 99285 EMERGENCY DEPT VISIT HI MDM: CPT | Mod: 25

## 2025-07-07 PROCEDURE — 85025 COMPLETE CBC W/AUTO DIFF WBC: CPT | Performed by: EMERGENCY MEDICINE

## 2025-07-07 PROCEDURE — 250N000011 HC RX IP 250 OP 636: Performed by: EMERGENCY MEDICINE

## 2025-07-07 PROCEDURE — 74177 CT ABD & PELVIS W/CONTRAST: CPT

## 2025-07-07 PROCEDURE — 84155 ASSAY OF PROTEIN SERUM: CPT | Performed by: EMERGENCY MEDICINE

## 2025-07-07 PROCEDURE — T1013 SIGN LANG/ORAL INTERPRETER: HCPCS | Mod: U4,TEL,95

## 2025-07-07 PROCEDURE — 250N000009 HC RX 250: Performed by: EMERGENCY MEDICINE

## 2025-07-07 RX ORDER — IOPAMIDOL 755 MG/ML
105 INJECTION, SOLUTION INTRAVASCULAR ONCE
Status: COMPLETED | OUTPATIENT
Start: 2025-07-07 | End: 2025-07-07

## 2025-07-07 RX ORDER — SUCRALFATE 1 G/1
1 TABLET ORAL 4 TIMES DAILY PRN
Qty: 90 TABLET | Refills: 0 | Status: SHIPPED | OUTPATIENT
Start: 2025-07-07

## 2025-07-07 RX ORDER — OMEPRAZOLE 20 MG/1
20 CAPSULE, DELAYED RELEASE ORAL DAILY
Qty: 30 CAPSULE | Refills: 0 | Status: SHIPPED | OUTPATIENT
Start: 2025-07-07 | End: 2025-08-06

## 2025-07-07 RX ADMIN — SODIUM CHLORIDE 70 ML: 9 INJECTION, SOLUTION INTRAVENOUS at 16:13

## 2025-07-07 RX ADMIN — IOPAMIDOL 105 ML: 755 INJECTION, SOLUTION INTRAVENOUS at 16:13

## 2025-07-07 ASSESSMENT — ACTIVITIES OF DAILY LIVING (ADL)
ADLS_ACUITY_SCORE: 41

## 2025-07-07 NOTE — ED PROVIDER NOTES
Emergency Department Note      History of Present Illness     Chief Complaint   Abdominal Pain      HPI   Earnest Short is a 75 year old female with a history of GERD and Helicobacter pylori infection presenting with epigastric abdominal pain. Earnest endorses 2 days of abdominal pain that has prevented her from sleeping and eating. She has been drinking water and reports vomiting. No medications.   No fever or urinary complaints.  Does not currently see a gastroenterologist.      Independent Historian   Daughter as detailed above.    Review of External Notes   None    Past Medical History     Medical History and Problem List   GERD  Helicobacter pylori infection  Vitamin D deficiency     Medications   Ondansetron  Pantoprazole  Sucrasulfate     Surgical History   Distal left femur fracture fixation     Physical Exam     Patient Vitals for the past 24 hrs:   BP Temp Temp src Pulse Resp SpO2   07/07/25 1430 (!) 153/82 -- -- -- -- --   07/07/25 1429 -- 98.1  F (36.7  C) Oral 91 16 100 %     Physical Exam  General: Alert, appears well-developed and well-nourished. Cooperative.     In mild distress  HEENT:  Head:  Atraumatic  Ears:  External ears are normal  Mouth/Throat:  Oropharynx is without erythema or exudate and mucous membranes are moist.   Eyes:   Conjunctivae normal and EOM are normal. No scleral icterus.  CV:  Normal rate, regular rhythm, normal heart sounds and radial pulses are 2+ and symmetric.  No murmur.  Resp:  Breath sounds are clear bilaterally    Non-labored, no retractions or accessory muscle use  GI:  Abdomen is soft, no distension, epigastric tenderness. No rebound or guarding.  No CVA tenderness bilaterally  MS:  Normal range of motion. No edema.    Normal strength in all 4 extremities.     Back atraumatic.    No midline cervical, thoracic, or lumbar tenderness  Skin:  Warm and dry.  No rash or lesions noted.  Neuro:   Alert. Normal strength.  GCS: 15  Psych: Normal mood and  affect.    Diagnostics     Lab Results   Labs Ordered and Resulted from Time of ED Arrival to Time of ED Departure   COMPREHENSIVE METABOLIC PANEL - Abnormal       Result Value    Sodium 130 (*)     Potassium 3.7      Carbon Dioxide (CO2) 25      Anion Gap 10      Urea Nitrogen 9.6      Creatinine 0.72      GFR Estimate 88      Calcium 9.2      Chloride 95 (*)     Glucose 98      Alkaline Phosphatase 47      AST 18      ALT 17      Protein Total 7.3      Albumin 3.8      Bilirubin Total 0.2     CBC WITH PLATELETS AND DIFFERENTIAL - Abnormal    WBC Count 6.8      RBC Count 4.30      Hemoglobin 9.6 (*)     Hematocrit 32.5 (*)     MCV 76 (*)     MCH 22.3 (*)     MCHC 29.5 (*)     RDW 16.9 (*)     Platelet Count 373      % Neutrophils 68      % Lymphocytes 20      % Monocytes 9      % Eosinophils 2      % Basophils 1      % Immature Granulocytes 0      NRBCs per 100 WBC 0      Absolute Neutrophils 4.6      Absolute Lymphocytes 1.3      Absolute Monocytes 0.6      Absolute Eosinophils 0.2      Absolute Basophils 0.1      Absolute Immature Granulocytes 0.0      Absolute NRBCs 0.0     LIPASE - Normal    Lipase 50         Imaging   CT Abdomen Pelvis w Contrast   Final Result   IMPRESSION:    1.  Large hiatal hernia without definite evidence of acute complication.   2.  No additional visualized explanation for patient's symptoms.   3.  Simple-appearing left adnexal cyst measuring up to 3.2 cm, stable from prior CT, could consider annual sonographic follow-up if clinically indicated.        Independent Interpretation   None    ED Course      Medications Administered   Medications   iopamidol (ISOVUE-370) solution 105 mL (105 mLs Intravenous $Given 7/7/25 1613)   sodium chloride 0.9 % bag for CT scan flush (70 mLs Intravenous $Given 7/7/25 1613)       Procedures   Procedures     Discussion of Management   None    ED Course        Additional Documentation  None    Medical Decision Making / Diagnosis     CMS Diagnoses:  None    MIPS   None               MDM   Elmamirza Ho Short is a 75 year old female who presents with epigastric abdominal pain for the last 24 hours.  CT scan today shows a large hiatal hernia without evidence of acute complication.  No bowel obstruction.  No signs constipation.  There is a incidental finding of a simple appearing left adnexal cyst measuring up to 3.2 cm seen on prior CT imaging and stable.  Annual ultrasound follow-up recommended and this can be done in the outpatient setting.  Incidental finding communicated to the patient.  Lab work reassuring with faint hyponatremia but this appears chronic for the patient over the last 10 months.  Renal function normal.  CBC within normal range with chronic anemia.  No significant change to hemoglobin or red blood cell count.  Lipase within normal range and low concern for pancreatitis.  Suspect symptoms could be related to gastritis versus symptomatic hiatal hernia.  Will trial Protonix omeprazole and Carafate for symptomatic management until she can establish and follow-up with primary care in the outpatient setting.  Patient referred to Dr. Berry's clinic for follow-up.  Referred back to primary care for incidental finding of adnexal cyst on the left side.  After return precautions understood and all questions answered, discharged home    Due to language barrier, an  was present during the history-taking and subsequent discussion (and for part of the physical exam) with this patient.    Disposition   The patient was discharged.     Diagnosis     ICD-10-CM    1. Epigastric pain  R10.13       2. Adnexal cyst  N94.9     Left sided, incidental finding 3.2 cm.       3. Hiatal hernia  K44.9            Discharge Medications   New Prescriptions    OMEPRAZOLE (PRILOSEC) 20 MG DR CAPSULE    Take 1 capsule (20 mg) by mouth daily.    SUCRALFATE (CARAFATE) 1 GM TABLET    Take 1 tablet (1 g) by mouth 4 times daily as needed for nausea.         Scribe  Disclosure:  I, Mari Raymond, am serving as a scribe at 5:09 PM on 7/7/2025 to document services personally performed by Gary Boggs MD based on my observations and the provider's statements to me.      Gary Boggs MD  07/07/25 5301

## 2025-07-07 NOTE — ED TRIAGE NOTES
Epigastric abdominal pain. Vomited x1.  Denies diarrhea, states she is constipated, does not remember the last BM.

## 2025-08-25 ENCOUNTER — HOSPITAL ENCOUNTER (EMERGENCY)
Facility: CLINIC | Age: 75
Discharge: HOME | End: 2025-08-26
Payer: COMMERCIAL

## 2025-08-25 ENCOUNTER — APPOINTMENT (OUTPATIENT)
Dept: GENERAL RADIOLOGY | Facility: CLINIC | Age: 75
End: 2025-08-25
Payer: COMMERCIAL

## 2025-08-25 DIAGNOSIS — K21.9 GASTROESOPHAGEAL REFLUX DISEASE WITHOUT ESOPHAGITIS: ICD-10-CM

## 2025-08-25 DIAGNOSIS — R42 VERTIGO: Primary | ICD-10-CM

## 2025-08-25 LAB
ANION GAP SERPL CALCULATED.3IONS-SCNC: 10 MMOL/L (ref 7–15)
ATRIAL RATE - MUSE: 96 BPM
BUN SERPL-MCNC: 8.8 MG/DL (ref 8–23)
CALCIUM SERPL-MCNC: 8.9 MG/DL (ref 8.8–10.4)
CHLORIDE SERPL-SCNC: 98 MMOL/L (ref 98–107)
CREAT SERPL-MCNC: 0.86 MG/DL (ref 0.51–0.95)
DIASTOLIC BLOOD PRESSURE - MUSE: NORMAL MMHG
EGFRCR SERPLBLD CKD-EPI 2021: 71 ML/MIN/1.73M2
ERYTHROCYTE [DISTWIDTH] IN BLOOD BY AUTOMATED COUNT: 17.8 % (ref 10–15)
GLUCOSE SERPL-MCNC: 102 MG/DL (ref 70–99)
HCO3 SERPL-SCNC: 23 MMOL/L (ref 22–29)
HCT VFR BLD AUTO: 32.7 % (ref 35–47)
HGB BLD-MCNC: 9.8 G/DL (ref 11.7–15.7)
HOLD SPECIMEN: NORMAL
INTERPRETATION ECG - MUSE: NORMAL
MCH RBC QN AUTO: 22.2 PG (ref 26.5–33)
MCHC RBC AUTO-ENTMCNC: 30 G/DL (ref 31.5–36.5)
MCV RBC AUTO: 74.1 FL (ref 78–100)
P AXIS - MUSE: 35 DEGREES
PLATELET # BLD AUTO: 380 10E3/UL (ref 150–450)
POTASSIUM SERPL-SCNC: 4 MMOL/L (ref 3.4–5.3)
PR INTERVAL - MUSE: 174 MS
QRS DURATION - MUSE: 86 MS
QT - MUSE: 312 MS
QTC - MUSE: 394 MS
R AXIS - MUSE: -17 DEGREES
RBC # BLD AUTO: 4.41 10E6/UL (ref 3.8–5.2)
SODIUM SERPL-SCNC: 131 MMOL/L (ref 135–145)
SYSTOLIC BLOOD PRESSURE - MUSE: NORMAL MMHG
T AXIS - MUSE: 32 DEGREES
TROPONIN T SERPL HS-MCNC: <6 NG/L
VENTRICULAR RATE- MUSE: 96 BPM
WBC # BLD AUTO: 9.15 10E3/UL (ref 4–11)

## 2025-08-25 PROCEDURE — 93005 ELECTROCARDIOGRAM TRACING: CPT

## 2025-08-25 PROCEDURE — 84484 ASSAY OF TROPONIN QUANT: CPT

## 2025-08-25 PROCEDURE — 85027 COMPLETE CBC AUTOMATED: CPT | Performed by: EMERGENCY MEDICINE

## 2025-08-25 PROCEDURE — 96360 HYDRATION IV INFUSION INIT: CPT

## 2025-08-25 PROCEDURE — 80048 BASIC METABOLIC PNL TOTAL CA: CPT

## 2025-08-25 PROCEDURE — 80048 BASIC METABOLIC PNL TOTAL CA: CPT | Performed by: EMERGENCY MEDICINE

## 2025-08-25 PROCEDURE — 99285 EMERGENCY DEPT VISIT HI MDM: CPT | Mod: 25

## 2025-08-25 PROCEDURE — 71046 X-RAY EXAM CHEST 2 VIEWS: CPT

## 2025-08-25 PROCEDURE — 36415 COLL VENOUS BLD VENIPUNCTURE: CPT | Performed by: EMERGENCY MEDICINE

## 2025-08-25 PROCEDURE — 250N000013 HC RX MED GY IP 250 OP 250 PS 637

## 2025-08-25 PROCEDURE — 85027 COMPLETE CBC AUTOMATED: CPT

## 2025-08-25 PROCEDURE — 258N000003 HC RX IP 258 OP 636

## 2025-08-25 PROCEDURE — 96361 HYDRATE IV INFUSION ADD-ON: CPT

## 2025-08-25 RX ORDER — MECLIZINE HYDROCHLORIDE 25 MG/1
25 TABLET ORAL ONCE
Status: COMPLETED | OUTPATIENT
Start: 2025-08-25 | End: 2025-08-25

## 2025-08-25 RX ORDER — MAGNESIUM HYDROXIDE/ALUMINUM HYDROXICE/SIMETHICONE 120; 1200; 1200 MG/30ML; MG/30ML; MG/30ML
15 SUSPENSION ORAL ONCE
Status: COMPLETED | OUTPATIENT
Start: 2025-08-25 | End: 2025-08-25

## 2025-08-25 RX ADMIN — ALUMINUM HYDROXIDE, MAGNESIUM HYDROXIDE, AND SIMETHICONE 15 ML: 200; 200; 20 SUSPENSION ORAL at 23:11

## 2025-08-25 RX ADMIN — SODIUM CHLORIDE 1000 ML: 0.9 INJECTION, SOLUTION INTRAVENOUS at 23:11

## 2025-08-25 RX ADMIN — MECLIZINE HYDROCHLORIDE 25 MG: 25 TABLET ORAL at 23:11

## 2025-08-25 ASSESSMENT — ACTIVITIES OF DAILY LIVING (ADL)
ADLS_ACUITY_SCORE: 41

## 2025-08-26 ENCOUNTER — APPOINTMENT (OUTPATIENT)
Dept: CT IMAGING | Facility: CLINIC | Age: 75
End: 2025-08-26
Payer: COMMERCIAL

## 2025-08-26 VITALS
DIASTOLIC BLOOD PRESSURE: 83 MMHG | TEMPERATURE: 98.5 F | HEART RATE: 99 BPM | OXYGEN SATURATION: 98 % | RESPIRATION RATE: 18 BRPM | SYSTOLIC BLOOD PRESSURE: 172 MMHG

## 2025-08-26 PROCEDURE — 70450 CT HEAD/BRAIN W/O DYE: CPT

## 2025-08-26 RX ORDER — MECLIZINE HCL 12.5 MG 12.5 MG/1
12.5 TABLET ORAL 4 TIMES DAILY PRN
Qty: 30 TABLET | Refills: 0 | Status: SHIPPED | OUTPATIENT
Start: 2025-08-26

## 2025-08-26 ASSESSMENT — ACTIVITIES OF DAILY LIVING (ADL): ADLS_ACUITY_SCORE: 41
